# Patient Record
Sex: MALE | Race: WHITE | NOT HISPANIC OR LATINO
[De-identification: names, ages, dates, MRNs, and addresses within clinical notes are randomized per-mention and may not be internally consistent; named-entity substitution may affect disease eponyms.]

---

## 2021-12-06 ENCOUNTER — APPOINTMENT (OUTPATIENT)
Dept: INTERNAL MEDICINE | Facility: CLINIC | Age: 61
End: 2021-12-06
Payer: COMMERCIAL

## 2021-12-06 ENCOUNTER — LABORATORY RESULT (OUTPATIENT)
Age: 61
End: 2021-12-06

## 2021-12-06 VITALS
SYSTOLIC BLOOD PRESSURE: 120 MMHG | DIASTOLIC BLOOD PRESSURE: 72 MMHG | WEIGHT: 219 LBS | RESPIRATION RATE: 12 BRPM | OXYGEN SATURATION: 96 % | HEART RATE: 70 BPM | BODY MASS INDEX: 28.12 KG/M2 | TEMPERATURE: 96.9 F

## 2021-12-06 DIAGNOSIS — E03.9 HYPOTHYROIDISM, UNSPECIFIED: ICD-10-CM

## 2021-12-06 PROCEDURE — 99386 PREV VISIT NEW AGE 40-64: CPT | Mod: 25

## 2021-12-06 PROCEDURE — 99406 BEHAV CHNG SMOKING 3-10 MIN: CPT

## 2021-12-06 NOTE — PHYSICAL EXAM
[No Acute Distress] : no acute distress [Well Nourished] : well nourished [Well-Appearing] : well-appearing [Normal Sclera/Conjunctiva] : normal sclera/conjunctiva [EOMI] : extraocular movements intact [Normal Outer Ear/Nose] : the outer ears and nose were normal in appearance [No JVD] : no jugular venous distention [No Lymphadenopathy] : no lymphadenopathy [Supple] : supple [Thyroid Normal, No Nodules] : the thyroid was normal and there were no nodules present [No Respiratory Distress] : no respiratory distress  [No Accessory Muscle Use] : no accessory muscle use [Clear to Auscultation] : lungs were clear to auscultation bilaterally [Normal Rate] : normal rate  [Regular Rhythm] : with a regular rhythm [Normal S1, S2] : normal S1 and S2 [No Murmur] : no murmur heard [No Carotid Bruits] : no carotid bruits [No Abdominal Bruit] : a ~M bruit was not heard ~T in the abdomen [No Varicosities] : no varicosities [Pedal Pulses Present] : the pedal pulses are present [No Edema] : there was no peripheral edema [No Palpable Aorta] : no palpable aorta [No Extremity Clubbing/Cyanosis] : no extremity clubbing/cyanosis [Soft] : abdomen soft [Non Tender] : non-tender [Non-distended] : non-distended [Normal Bowel Sounds] : normal bowel sounds [Normal Posterior Cervical Nodes] : no posterior cervical lymphadenopathy [Normal Anterior Cervical Nodes] : no anterior cervical lymphadenopathy [No CVA Tenderness] : no CVA  tenderness [No Spinal Tenderness] : no spinal tenderness [No Joint Swelling] : no joint swelling [Grossly Normal Strength/Tone] : grossly normal strength/tone [No Rash] : no rash [Coordination Grossly Intact] : coordination grossly intact [No Focal Deficits] : no focal deficits [Normal Gait] : normal gait [Normal Affect] : the affect was normal [Normal Insight/Judgement] : insight and judgment were intact

## 2021-12-08 NOTE — ASSESSMENT
[FreeTextEntry1] : Physical\par \par Declines flu shot\par \par Weight management, healthy food choices, and increased physical activity \par \par Active smoker > 30pack years\par Smoking cessation discussed\par referred for low dose CT chest\par \par Hypothyroid\par cont Levothyroxine 75mcg daily\par we'll check TSH/T4 today\par \par blood work ordered\par \par follow up in one week for lab results

## 2021-12-08 NOTE — HISTORY OF PRESENT ILLNESS
[de-identified] : Mr. SYLVESTER DUBON is a 61 year old male, accompanied by his wife, with history of hypothyroid presents for initial evaluation /  annual physical\par \par He is doing well, Offers no complaints \par He admits not consistent with exercise, but tries to maintain a low cholesterol diet\par Denies SOB, chest pain, abdominal pain, N/V/D, leg swelling, headache, dizziness \par \par Currently smokes 1 pack/day for > than 30 years

## 2021-12-08 NOTE — HEALTH RISK ASSESSMENT
[Yes] : Yes [Patient reported colonoscopy was normal] : Patient reported colonoscopy was normal [Employed] : employed [] :  [Sexually Active] : sexually active [de-identified] : 1 pack/day > 30 years [de-identified] : Socially  [ColonoscopyDate] : 2019

## 2021-12-08 NOTE — COUNSELING
[Risk of tobacco use and health benefits of smoking cessation discussed] : Risk of tobacco use and health benefits of smoking cessation discussed [Use of nicotine replacement therapies and other medications discussed] : Use of nicotine replacement therapies and other medications discussed [Benefits of weight loss discussed] : Benefits of weight loss discussed [Encouraged to increase physical activity] : Encouraged to increase physical activity

## 2021-12-14 LAB
25(OH)D3 SERPL-MCNC: 36.2 NG/ML
ALBUMIN SERPL ELPH-MCNC: 4.4 G/DL
ALP BLD-CCNC: 58 U/L
ALT SERPL-CCNC: 11 U/L
ANION GAP SERPL CALC-SCNC: 14 MMOL/L
APPEARANCE: CLEAR
AST SERPL-CCNC: 17 U/L
BASOPHILS # BLD AUTO: 0.08 K/UL
BASOPHILS NFR BLD AUTO: 0.9 %
BILIRUB SERPL-MCNC: 0.3 MG/DL
BILIRUBIN URINE: NEGATIVE
BLOOD URINE: NEGATIVE
BUN SERPL-MCNC: 13 MG/DL
CALCIUM SERPL-MCNC: 9.8 MG/DL
CHLORIDE SERPL-SCNC: 102 MMOL/L
CHOLEST SERPL-MCNC: 243 MG/DL
CO2 SERPL-SCNC: 21 MMOL/L
COLOR: NORMAL
COVID-19 NUCLEOCAPSID  GAM ANTIBODY INTERPRETATION: NEGATIVE
COVID-19 SPIKE DOMAIN ANTIBODY INTERPRETATION: POSITIVE
CREAT SERPL-MCNC: 0.84 MG/DL
EOSINOPHIL # BLD AUTO: 0.23 K/UL
EOSINOPHIL NFR BLD AUTO: 2.6 %
ESTIMATED AVERAGE GLUCOSE: 114 MG/DL
GLUCOSE QUALITATIVE U: NEGATIVE
GLUCOSE SERPL-MCNC: 59 MG/DL
HBA1C MFR BLD HPLC: 5.6 %
HCT VFR BLD CALC: 48.3 %
HDLC SERPL-MCNC: 38 MG/DL
HGB BLD-MCNC: 15.7 G/DL
IMM GRANULOCYTES NFR BLD AUTO: 0.6 %
KETONES URINE: NEGATIVE
LDLC SERPL CALC-MCNC: 186 MG/DL
LEUKOCYTE ESTERASE URINE: NEGATIVE
LYMPHOCYTES # BLD AUTO: 3.15 K/UL
LYMPHOCYTES NFR BLD AUTO: 35.4 %
MAN DIFF?: NORMAL
MCHC RBC-ENTMCNC: 31.5 PG
MCHC RBC-ENTMCNC: 32.5 GM/DL
MCV RBC AUTO: 96.8 FL
MONOCYTES # BLD AUTO: 0.52 K/UL
MONOCYTES NFR BLD AUTO: 5.8 %
NEUTROPHILS # BLD AUTO: 4.88 K/UL
NEUTROPHILS NFR BLD AUTO: 54.7 %
NITRITE URINE: NEGATIVE
NONHDLC SERPL-MCNC: 205 MG/DL
PH URINE: 5
PLATELET # BLD AUTO: 219 K/UL
POTASSIUM SERPL-SCNC: 4.2 MMOL/L
PROT SERPL-MCNC: 7.3 G/DL
PROTEIN URINE: NEGATIVE
PSA SERPL-MCNC: 1.4 NG/ML
RBC # BLD: 4.99 M/UL
RBC # FLD: 13 %
SARS-COV-2 AB SERPL IA-ACNC: >250 U/ML
SARS-COV-2 AB SERPL QL IA: 0.09 INDEX
SODIUM SERPL-SCNC: 137 MMOL/L
SPECIFIC GRAVITY URINE: 1.02
TRIGL SERPL-MCNC: 94 MG/DL
TSH SERPL-ACNC: 4.26 UIU/ML
UROBILINOGEN URINE: NORMAL
VIT B12 SERPL-MCNC: 607 PG/ML
WBC # FLD AUTO: 8.91 K/UL

## 2021-12-27 ENCOUNTER — APPOINTMENT (OUTPATIENT)
Dept: UROLOGY | Facility: CLINIC | Age: 61
End: 2021-12-27
Payer: COMMERCIAL

## 2021-12-27 VITALS
HEIGHT: 74 IN | DIASTOLIC BLOOD PRESSURE: 72 MMHG | TEMPERATURE: 96.6 F | OXYGEN SATURATION: 96 % | RESPIRATION RATE: 12 BRPM | SYSTOLIC BLOOD PRESSURE: 109 MMHG | HEART RATE: 80 BPM

## 2021-12-27 DIAGNOSIS — N52.9 MALE ERECTILE DYSFUNCTION, UNSPECIFIED: ICD-10-CM

## 2021-12-27 PROCEDURE — 99204 OFFICE O/P NEW MOD 45 MIN: CPT

## 2021-12-27 PROCEDURE — 99406 BEHAV CHNG SMOKING 3-10 MIN: CPT

## 2021-12-27 RX ORDER — SILDENAFIL 20 MG/1
20 TABLET ORAL DAILY
Qty: 90 | Refills: 1 | Status: ACTIVE | COMMUNITY
Start: 2021-12-27 | End: 1900-01-01

## 2021-12-27 NOTE — ASSESSMENT
[FreeTextEntry1] : Very pleasant 61-year-old gentleman who presents for evaluation of erectile dysfunction, screening for prostate cancer\par -Discussed the rationale for screening for prostate cancer with PSA and digital rectal exam. We discussed risk factors for the development of prostate cancer.  We also discussed the natural history of prostate cancer.\par -Hemoglobin A1c 5.6\par -Urinalysis demonstrates no evidence of urinary tract infection no blood in urine\par -PSA 1.4\par -Discussed that his risk for prostate cancer is low given his negative PARMINDER and low PSA\par -Continue sildenafil 20 mg–refill sent to the pharmacy\par -I discussed the risks, benefits, alternatives, and possible side effects of Viagra (sildenafil) therapy with the patient, including but not limited to headache, flushing, upset stomach, blurry vision, change in color vision, vision loss, and priapism with the patient.\par -Patient was counseled on smoking cessation for 5 minutes.  We discussed various health benefits of quitting.  We discussed the potential Urologic implications of smoking, including an increased risk of bladder and upper tract urothelial carcinoma, kidney cancer, and a potential link to erectile dysfunction.\par -Follow-up in 1 year

## 2021-12-27 NOTE — HISTORY OF PRESENT ILLNESS
[FreeTextEntry1] : Very pleasant 61-year-old gentleman presents for evaluation of erectile dysfunction and screening for prostate cancer.  He reports that sildenafil 20 mg significantly improves his erections.  He reports that he is happy with this medication.  He denies dysuria.  No hematuria.  No nausea or vomiting.  No difficulty urinating.  He reports no family history of  malignancy, including prostate cancer.  Recent PSA was found to be 1.4 from December 2021.  Urinalysis demonstrated no evidence of urinary tract infection.  No microscopic hematuria.  Hemoglobin A1c 5.6\par \par Patient smokes cigarettes daily

## 2022-03-31 DIAGNOSIS — E78.00 PURE HYPERCHOLESTEROLEMIA, UNSPECIFIED: ICD-10-CM

## 2022-10-16 ENCOUNTER — INPATIENT (INPATIENT)
Facility: HOSPITAL | Age: 62
LOS: 6 days | Discharge: HOME | End: 2022-10-23
Attending: INTERNAL MEDICINE | Admitting: INTERNAL MEDICINE
Payer: COMMERCIAL

## 2022-10-16 VITALS
RESPIRATION RATE: 22 BRPM | DIASTOLIC BLOOD PRESSURE: 83 MMHG | OXYGEN SATURATION: 100 % | HEART RATE: 69 BPM | SYSTOLIC BLOOD PRESSURE: 117 MMHG

## 2022-10-16 DIAGNOSIS — I21.9 ACUTE MYOCARDIAL INFARCTION, UNSPECIFIED: ICD-10-CM

## 2022-10-16 LAB
ALBUMIN SERPL ELPH-MCNC: 4.3 G/DL — SIGNIFICANT CHANGE UP (ref 3.3–5)
ALP SERPL-CCNC: 64 U/L — SIGNIFICANT CHANGE UP (ref 40–120)
ALT FLD-CCNC: 20 U/L — SIGNIFICANT CHANGE UP (ref 4–41)
ANION GAP SERPL CALC-SCNC: 13 MMOL/L — SIGNIFICANT CHANGE UP (ref 7–14)
ANION GAP SERPL CALC-SCNC: 16 MMOL/L — HIGH (ref 7–14)
APTT BLD: >200 SEC — CRITICAL HIGH (ref 27–36.3)
AST SERPL-CCNC: 21 U/L — SIGNIFICANT CHANGE UP (ref 4–40)
B PERT DNA SPEC QL NAA+PROBE: SIGNIFICANT CHANGE UP
B PERT+PARAPERT DNA PNL SPEC NAA+PROBE: SIGNIFICANT CHANGE UP
BASOPHILS # BLD AUTO: 0 K/UL — SIGNIFICANT CHANGE UP (ref 0–0.2)
BASOPHILS NFR BLD AUTO: 0 % — SIGNIFICANT CHANGE UP (ref 0–2)
BILIRUB SERPL-MCNC: 0.5 MG/DL — SIGNIFICANT CHANGE UP (ref 0.2–1.2)
BLD GP AB SCN SERPL QL: POSITIVE — SIGNIFICANT CHANGE UP
BORDETELLA PARAPERTUSSIS (RAPRVP): SIGNIFICANT CHANGE UP
BUN SERPL-MCNC: 14 MG/DL — SIGNIFICANT CHANGE UP (ref 7–23)
BUN SERPL-MCNC: 15 MG/DL — SIGNIFICANT CHANGE UP (ref 7–23)
C PNEUM DNA SPEC QL NAA+PROBE: SIGNIFICANT CHANGE UP
CALCIUM SERPL-MCNC: 9.2 MG/DL — SIGNIFICANT CHANGE UP (ref 8.4–10.5)
CALCIUM SERPL-MCNC: 9.5 MG/DL — SIGNIFICANT CHANGE UP (ref 8.4–10.5)
CHLORIDE SERPL-SCNC: 100 MMOL/L — SIGNIFICANT CHANGE UP (ref 98–107)
CHLORIDE SERPL-SCNC: 99 MMOL/L — SIGNIFICANT CHANGE UP (ref 98–107)
CK MB BLD-MCNC: 9.2 % — HIGH (ref 0–2.5)
CK MB CFR SERPL CALC: 264.9 NG/ML — HIGH
CK MB CFR SERPL CALC: 4.7 NG/ML — SIGNIFICANT CHANGE UP
CK SERPL-CCNC: 2890 U/L — HIGH (ref 30–200)
CO2 SERPL-SCNC: 21 MMOL/L — LOW (ref 22–31)
CO2 SERPL-SCNC: 21 MMOL/L — LOW (ref 22–31)
CREAT SERPL-MCNC: 0.72 MG/DL — SIGNIFICANT CHANGE UP (ref 0.5–1.3)
CREAT SERPL-MCNC: 1.01 MG/DL — SIGNIFICANT CHANGE UP (ref 0.5–1.3)
EGFR: 105 ML/MIN/1.73M2 — SIGNIFICANT CHANGE UP
EGFR: 85 ML/MIN/1.73M2 — SIGNIFICANT CHANGE UP
EOSINOPHIL # BLD AUTO: 0 K/UL — SIGNIFICANT CHANGE UP (ref 0–0.5)
EOSINOPHIL NFR BLD AUTO: 0 % — SIGNIFICANT CHANGE UP (ref 0–6)
FLUAV SUBTYP SPEC NAA+PROBE: SIGNIFICANT CHANGE UP
FLUBV RNA SPEC QL NAA+PROBE: SIGNIFICANT CHANGE UP
GLUCOSE SERPL-MCNC: 118 MG/DL — HIGH (ref 70–99)
GLUCOSE SERPL-MCNC: 139 MG/DL — HIGH (ref 70–99)
HADV DNA SPEC QL NAA+PROBE: SIGNIFICANT CHANGE UP
HCOV 229E RNA SPEC QL NAA+PROBE: SIGNIFICANT CHANGE UP
HCOV HKU1 RNA SPEC QL NAA+PROBE: SIGNIFICANT CHANGE UP
HCOV NL63 RNA SPEC QL NAA+PROBE: SIGNIFICANT CHANGE UP
HCOV OC43 RNA SPEC QL NAA+PROBE: SIGNIFICANT CHANGE UP
HCT VFR BLD CALC: 43.3 % — SIGNIFICANT CHANGE UP (ref 39–50)
HCT VFR BLD CALC: 43.8 % — SIGNIFICANT CHANGE UP (ref 39–50)
HCT VFR BLD CALC: 43.9 % — SIGNIFICANT CHANGE UP (ref 39–50)
HGB BLD-MCNC: 14.6 G/DL — SIGNIFICANT CHANGE UP (ref 13–17)
HGB BLD-MCNC: 14.8 G/DL — SIGNIFICANT CHANGE UP (ref 13–17)
HGB BLD-MCNC: 14.9 G/DL — SIGNIFICANT CHANGE UP (ref 13–17)
HMPV RNA SPEC QL NAA+PROBE: SIGNIFICANT CHANGE UP
HPIV1 RNA SPEC QL NAA+PROBE: SIGNIFICANT CHANGE UP
HPIV2 RNA SPEC QL NAA+PROBE: SIGNIFICANT CHANGE UP
HPIV3 RNA SPEC QL NAA+PROBE: SIGNIFICANT CHANGE UP
HPIV4 RNA SPEC QL NAA+PROBE: SIGNIFICANT CHANGE UP
IANC: 11.32 K/UL — HIGH (ref 1.8–7.4)
LYMPHOCYTES # BLD AUTO: 13.1 % — SIGNIFICANT CHANGE UP (ref 13–44)
LYMPHOCYTES # BLD AUTO: 2.26 K/UL — SIGNIFICANT CHANGE UP (ref 1–3.3)
M PNEUMO DNA SPEC QL NAA+PROBE: SIGNIFICANT CHANGE UP
MAGNESIUM SERPL-MCNC: 1.9 MG/DL — SIGNIFICANT CHANGE UP (ref 1.6–2.6)
MAGNESIUM SERPL-MCNC: 1.9 MG/DL — SIGNIFICANT CHANGE UP (ref 1.6–2.6)
MCHC RBC-ENTMCNC: 30.8 PG — SIGNIFICANT CHANGE UP (ref 27–34)
MCHC RBC-ENTMCNC: 31.1 PG — SIGNIFICANT CHANGE UP (ref 27–34)
MCHC RBC-ENTMCNC: 31.2 PG — SIGNIFICANT CHANGE UP (ref 27–34)
MCHC RBC-ENTMCNC: 33.7 GM/DL — SIGNIFICANT CHANGE UP (ref 32–36)
MCHC RBC-ENTMCNC: 33.7 GM/DL — SIGNIFICANT CHANGE UP (ref 32–36)
MCHC RBC-ENTMCNC: 34 GM/DL — SIGNIFICANT CHANGE UP (ref 32–36)
MCV RBC AUTO: 91.4 FL — SIGNIFICANT CHANGE UP (ref 80–100)
MCV RBC AUTO: 91.4 FL — SIGNIFICANT CHANGE UP (ref 80–100)
MCV RBC AUTO: 92.4 FL — SIGNIFICANT CHANGE UP (ref 80–100)
MONOCYTES # BLD AUTO: 0.91 K/UL — HIGH (ref 0–0.9)
MONOCYTES NFR BLD AUTO: 5.3 % — SIGNIFICANT CHANGE UP (ref 2–14)
NEUTROPHILS # BLD AUTO: 11.8 K/UL — HIGH (ref 1.8–7.4)
NEUTROPHILS NFR BLD AUTO: 68.4 % — SIGNIFICANT CHANGE UP (ref 43–77)
NRBC # BLD: 0 /100 WBCS — SIGNIFICANT CHANGE UP (ref 0–0)
NRBC # BLD: 0 /100 WBCS — SIGNIFICANT CHANGE UP (ref 0–0)
NRBC # FLD: 0 K/UL — SIGNIFICANT CHANGE UP (ref 0–0)
NRBC # FLD: 0 K/UL — SIGNIFICANT CHANGE UP (ref 0–0)
NT-PROBNP SERPL-SCNC: 45 PG/ML — SIGNIFICANT CHANGE UP
PHOSPHATE SERPL-MCNC: 3.8 MG/DL — SIGNIFICANT CHANGE UP (ref 2.5–4.5)
PLATELET # BLD AUTO: 204 K/UL — SIGNIFICANT CHANGE UP (ref 150–400)
PLATELET # BLD AUTO: 212 K/UL — SIGNIFICANT CHANGE UP (ref 150–400)
PLATELET # BLD AUTO: 264 K/UL — SIGNIFICANT CHANGE UP (ref 150–400)
POTASSIUM SERPL-MCNC: 3.5 MMOL/L — SIGNIFICANT CHANGE UP (ref 3.5–5.3)
POTASSIUM SERPL-MCNC: 4.3 MMOL/L — SIGNIFICANT CHANGE UP (ref 3.5–5.3)
POTASSIUM SERPL-SCNC: 3.5 MMOL/L — SIGNIFICANT CHANGE UP (ref 3.5–5.3)
POTASSIUM SERPL-SCNC: 4.3 MMOL/L — SIGNIFICANT CHANGE UP (ref 3.5–5.3)
PROT SERPL-MCNC: 7.1 G/DL — SIGNIFICANT CHANGE UP (ref 6–8.3)
RAPID RVP RESULT: SIGNIFICANT CHANGE UP
RBC # BLD: 4.74 M/UL — SIGNIFICANT CHANGE UP (ref 4.2–5.8)
RBC # BLD: 4.75 M/UL — SIGNIFICANT CHANGE UP (ref 4.2–5.8)
RBC # BLD: 4.79 M/UL — SIGNIFICANT CHANGE UP (ref 4.2–5.8)
RBC # FLD: 12.6 % — SIGNIFICANT CHANGE UP (ref 10.3–14.5)
RBC # FLD: 12.6 % — SIGNIFICANT CHANGE UP (ref 10.3–14.5)
RBC # FLD: 12.9 % — SIGNIFICANT CHANGE UP (ref 10.3–14.5)
RH IG SCN BLD-IMP: POSITIVE — SIGNIFICANT CHANGE UP
RSV RNA SPEC QL NAA+PROBE: SIGNIFICANT CHANGE UP
RV+EV RNA SPEC QL NAA+PROBE: SIGNIFICANT CHANGE UP
SARS-COV-2 RNA SPEC QL NAA+PROBE: SIGNIFICANT CHANGE UP
SODIUM SERPL-SCNC: 134 MMOL/L — LOW (ref 135–145)
SODIUM SERPL-SCNC: 136 MMOL/L — SIGNIFICANT CHANGE UP (ref 135–145)
TROPONIN T, HIGH SENSITIVITY RESULT: 11 NG/L — SIGNIFICANT CHANGE UP
TROPONIN T, HIGH SENSITIVITY RESULT: 5370 NG/L — CRITICAL HIGH
WBC # BLD: 15.75 K/UL — HIGH (ref 3.8–10.5)
WBC # BLD: 17.25 K/UL — HIGH (ref 3.8–10.5)
WBC # BLD: 17.56 K/UL — HIGH (ref 3.8–10.5)
WBC # FLD AUTO: 15.75 K/UL — HIGH (ref 3.8–10.5)
WBC # FLD AUTO: 17.25 K/UL — HIGH (ref 3.8–10.5)
WBC # FLD AUTO: 17.56 K/UL — HIGH (ref 3.8–10.5)

## 2022-10-16 PROCEDURE — 33967 INSERT I-AORT PERCUT DEVICE: CPT

## 2022-10-16 PROCEDURE — 71045 X-RAY EXAM CHEST 1 VIEW: CPT | Mod: 26

## 2022-10-16 PROCEDURE — 93458 L HRT ARTERY/VENTRICLE ANGIO: CPT | Mod: 26,59

## 2022-10-16 PROCEDURE — 86077 PHYS BLOOD BANK SERV XMATCH: CPT

## 2022-10-16 PROCEDURE — 92941 PRQ TRLML REVSC TOT OCCL AMI: CPT | Mod: LD

## 2022-10-16 PROCEDURE — 93010 ELECTROCARDIOGRAM REPORT: CPT

## 2022-10-16 PROCEDURE — 99285 EMERGENCY DEPT VISIT HI MDM: CPT

## 2022-10-16 RX ORDER — HEPARIN SODIUM 5000 [USP'U]/ML
1000 INJECTION INTRAVENOUS; SUBCUTANEOUS
Qty: 25000 | Refills: 0 | Status: DISCONTINUED | OUTPATIENT
Start: 2022-10-16 | End: 2022-10-17

## 2022-10-16 RX ORDER — ACETAMINOPHEN 500 MG
1000 TABLET ORAL ONCE
Refills: 0 | Status: COMPLETED | OUTPATIENT
Start: 2022-10-16 | End: 2022-10-16

## 2022-10-16 RX ORDER — POTASSIUM CHLORIDE 20 MEQ
40 PACKET (EA) ORAL ONCE
Refills: 0 | Status: COMPLETED | OUTPATIENT
Start: 2022-10-16 | End: 2022-10-16

## 2022-10-16 RX ORDER — ACETAMINOPHEN 500 MG
650 TABLET ORAL EVERY 6 HOURS
Refills: 0 | Status: DISCONTINUED | OUTPATIENT
Start: 2022-10-16 | End: 2022-10-16

## 2022-10-16 RX ORDER — ALPRAZOLAM 0.25 MG
0.25 TABLET ORAL ONCE
Refills: 0 | Status: DISCONTINUED | OUTPATIENT
Start: 2022-10-16 | End: 2022-10-16

## 2022-10-16 RX ORDER — ATORVASTATIN CALCIUM 80 MG/1
80 TABLET, FILM COATED ORAL AT BEDTIME
Refills: 0 | Status: DISCONTINUED | OUTPATIENT
Start: 2022-10-16 | End: 2022-10-23

## 2022-10-16 RX ORDER — TICAGRELOR 90 MG/1
90 TABLET ORAL EVERY 12 HOURS
Refills: 0 | Status: DISCONTINUED | OUTPATIENT
Start: 2022-10-17 | End: 2022-10-23

## 2022-10-16 RX ORDER — TICAGRELOR 90 MG/1
180 TABLET ORAL ONCE
Refills: 0 | Status: COMPLETED | OUTPATIENT
Start: 2022-10-16 | End: 2022-10-16

## 2022-10-16 RX ORDER — ASPIRIN/CALCIUM CARB/MAGNESIUM 324 MG
81 TABLET ORAL DAILY
Refills: 0 | Status: DISCONTINUED | OUTPATIENT
Start: 2022-10-17 | End: 2022-10-23

## 2022-10-16 RX ORDER — HEPARIN SODIUM 5000 [USP'U]/ML
4000 INJECTION INTRAVENOUS; SUBCUTANEOUS ONCE
Refills: 0 | Status: COMPLETED | OUTPATIENT
Start: 2022-10-16 | End: 2022-10-16

## 2022-10-16 RX ORDER — NICOTINE POLACRILEX 2 MG
1 GUM BUCCAL DAILY
Refills: 0 | Status: DISCONTINUED | OUTPATIENT
Start: 2022-10-16 | End: 2022-10-23

## 2022-10-16 RX ADMIN — Medication 0.25 MILLIGRAM(S): at 22:31

## 2022-10-16 RX ADMIN — Medication 1000 MILLIGRAM(S): at 20:49

## 2022-10-16 RX ADMIN — Medication 40 MILLIEQUIVALENT(S): at 20:02

## 2022-10-16 RX ADMIN — ATORVASTATIN CALCIUM 80 MILLIGRAM(S): 80 TABLET, FILM COATED ORAL at 21:08

## 2022-10-16 RX ADMIN — HEPARIN SODIUM 4000 UNIT(S): 5000 INJECTION INTRAVENOUS; SUBCUTANEOUS at 16:10

## 2022-10-16 RX ADMIN — TICAGRELOR 180 MILLIGRAM(S): 90 TABLET ORAL at 16:10

## 2022-10-16 RX ADMIN — HEPARIN SODIUM 10 UNIT(S)/HR: 5000 INJECTION INTRAVENOUS; SUBCUTANEOUS at 20:09

## 2022-10-16 RX ADMIN — Medication 400 MILLIGRAM(S): at 20:19

## 2022-10-16 RX ADMIN — Medication 1 PATCH: at 20:08

## 2022-10-16 NOTE — ED PROVIDER NOTE - OBJECTIVE STATEMENT
62 Y M H/O HCL, Smoking, presenting with acute onset mid sternal chest pain with B/L UE numbness after viagra use, now complaining of 62 Y M H/O HCL, Smoking, early Fhx MI death, presenting with acute onset mid sternal chest pain with B/L UE numbness after viagra use, now complaining of midsternal tearing chest pain, B/L UE numbness,

## 2022-10-16 NOTE — H&P ADULT - NSHPPHYSICALEXAM_GEN_ALL_CORE
.  VITAL SIGNS:  T(C): --  T(F): --  HR: 69 (10-16-22 @ 16:03) (69 - 69)  BP: 117/83 (10-16-22 @ 16:03) (117/83 - 117/83)  BP(mean): --  RR: 22 (10-16-22 @ 16:03) (22 - 22)  SpO2: 100% (10-16-22 @ 16:03) (100% - 100%)  Wt(kg): --    PHYSICAL EXAM:    Constitutional: WDWN resting comfortably in bed; NAD  Head: NC/AT  Eyes: PERRL, EOMI, anicteric sclera  ENT: no nasal discharge; uvula midline, no oropharyngeal erythema or exudates; MMM  Neck: supple; no JVD or thyromegaly  Respiratory: CTA B/L; no W/R/R, no retractions  Cardiac: +S1/S2; RRR; no M/R/G; PMI non-displaced  Gastrointestinal: soft, NT/ND; no rebound or guarding; +BSx4  Genitourinary: normal external genitalia  Back: spine midline, no bony tenderness or step-offs; no CVAT B/L  Extremities: WWP, no clubbing or cyanosis; no peripheral edema. Capillary refill <2 sec  Musculoskeletal: NROM x4; no joint swelling, tenderness or erythema  Vascular: 2+ radial, femoral, DP/PT pulses B/L  Dermatologic: skin warm, dry and intact; no rashes, wounds, or scars  Lymphatic: no submandibular or cervical LAD  Neurologic: AAOx3; CNII-XII grossly intact; no focal deficits  Psychiatric: affect and characteristics of appearance, verbalizations, behaviors are appropriate

## 2022-10-16 NOTE — ED PROVIDER NOTE - CLINICAL SUMMARY MEDICAL DECISION MAKING FREE TEXT BOX
62 Y M presenting with midsternal chest pain, primary concern for acute MI vs. dissection, in setting of tearing midsternal chest pain, tearing, B/L UE numbness, with decreased diastolic pressure on L. arm compared to R. arm, will rule out aortic dissection prior to angiography in setting of risk of missed aortic emergency. cardiology consult, holding off on anticoagulation prior to cardiology evaluation.

## 2022-10-16 NOTE — ED ADULT NURSE NOTE - OBJECTIVE STATEMENT
Pt is a 63 y/o Male, A&Ox4, ambulatory at baseline BIBEMS from home c/o sudden onset tearing midsternal chest pain. Pt reports b/l upper extremity numbness after taking Viagra. Pt appears pale, diaphoretic and tachypneic. Pt's penis not noted to be erect after taking Viagra. Pt currently on NRB for comfort, SpO2 100%. 18g in RAC and 18g LAC placed by EMS. Pt is a 63 y/o Male, A&Ox4, ambulatory at baseline BIBEMS from home c/o sudden onset tearing midsternal chest pain. Pt reports b/l upper extremity numbness after taking Viagra. Pt appears pale, diaphoretic and tachypneic. Pt's penis not noted to be erect after taking Viagra. Pt currently on NRB for comfort, SpO2 100%. Arrives with wife at bedside. Appears very uncomfortable upon initial exam. Placed on cardiac monitor/Zoll monitoring. ED tech at bedside for repeat EKG. CCU RN also at bedside. Facilitator Shirin monitoring. 18g in RAC and 18g LAC placed by EMS PTA, IV patent and flushing well. Medicated per MD orders, see MAR. Labs collected and sent, safety maintained. Pt sent to cath lab accompanied with RN. Being closely monitored throughout.

## 2022-10-16 NOTE — ED PROVIDER NOTE - PHYSICAL EXAMINATION
General: Diaphoretic  Head: Atraumatic  Eyes: EOM grossly in tact, no scleral icterus  ENT: moist mucous membranes  Neurology: A&Ox3, nonfocal, SULTANA x 4  Respiratory: normal respiratory effort  CV: Extremities warm and well perfused  Abdominal: Soft, non-distended  Extremities: No edema  Skin: No rashes

## 2022-10-16 NOTE — ED PROVIDER NOTE - PROGRESS NOTE DETAILS
Jamir Herrera MD:  Presenting with acute onset chest pain s/p viagra, L. sided chest pain with B/L UE numbness, ST [id number]; however, in setting ofnew exertional activity B/L UE numbness, will R/O dissection prior to anticoagulation, CODe stemi activated, awaiting cardiologist. Improving pain by comparison to arrive s/p further fluid resuscitation. Jamir Herrera MD:  Attempting to contact cath lab for transport Jamir Herrera MD:  Cardiology assuming direct care for patient, ordering heparin and brilinta per cardiology request, will evaluate for Dissection in cardiology lab.

## 2022-10-16 NOTE — PATIENT PROFILE ADULT - FALL HARM RISK - HARM RISK INTERVENTIONS

## 2022-10-16 NOTE — ED PROVIDER NOTE - ATTENDING CONTRIBUTION TO CARE
The patient is a 62y Male who has a past medical and surgery history of  HTN HLD PTED with chest pain while exertion self sudden sharp radiating to both arms with yesenia upper extremity numbness after viagra use    Vital Signs Last 24 Hrs  117/83 HR 64 RR 24 SPO2 100%   PE: diaphoretic anxious as described; DATA:  EKG: NSR@64 AS wall MI no old EKG   LAB: Pending at time of evaluation    IMPRESSION/RISK:  Dx= chest pain ? ACS    Consideration include hypotension in setting of exertion hypotension diaphoresis     Plan  Cardiology at bedside after stemi email phone conversation  labs sent   asa brillinta bolus heparin  CTA to be performed on table

## 2022-10-16 NOTE — H&P ADULT - ASSESSMENT
63 yo M PMH current smoker presenting with acute onset mid sternal chest pain with B/L UE numbness s/p viagra use, found to hae LUKASZ in ____    NEURO    RESP    CV  #STEMI    GI        ENDO    ID    PPX  63 yo M PMH current smoker presenting with acute onset mid sternal chest pain with B/L UE numbness s/p viagra use, found to hae LUKASZ in V1-6 and II with T wave inversions in III, s/p NORMAN pLAD with planned staging to RCA and Cx.     NEURO  -No issues  -A/O 3    RESP  -No issues  -Satting well on RA    CV  #STEMI  -EKG NSR with LUKASZ in V1-6 and II with T wave inversions in III  -S/p cath with significant occlusions in RCA and Cx, 99% occlusion of proximal LAD s/p PCI  -S/p ASA and brilinta load 10/16. C/w daily DAPT  -C/w heparin gtt   -F/u A1c, TSH, lipid panel  -Daily CXR for intraaortic balloon pump  -Trend Trop (11 at admission) and CKMB    #CAD  -Atorvastatin 80 mg daily     GI  -No issues  -DASH diet    /Renal  -No issues    ENDO  -F/u A1c  -Pt with known hypothyroidism, on levothyroxine 25    ID  #Leukocytosis  -WBC 17 on admission, CTM    PPX   #DVTppx  -On heparin gtt  61 yo M PMH current smoker presenting with acute onset mid sternal chest pain with B/L UE numbness s/p viagra use, found to hae LUKASZ in V1-6 and II with T wave inversions in III, s/p NORMAN pLAD with planned staging to RCA and Cx.     NEURO  -No issues  -A/O 3    RESP  -No issues  -Satting well on RA    CV  #STEMI  -EKG NSR with LUKASZ in V1-6 and II with T wave inversions in III  -S/p cath with significant occlusions in RCA and Cx, 99% occlusion of proximal LAD s/p PCI  -S/p ASA and brilinta load 10/16. C/w daily DAPT  -C/w heparin gtt   -F/u A1c, TSH, lipid panel  -Daily CXR for intraaortic balloon pump  -Trend Trop (11 at admission) and CKMB    Addendum: Spoke to Dr. Blount regarding persistent AIVR - CTM. If augmented BP remains in low 80s, can start lidocaine 50     #CAD  -Atorvastatin 80 mg daily     GI  -No issues  -DASH diet    /Renal  -No issues    ENDO  -F/u A1c  -Pt with known hypothyroidism, on levothyroxine 25    ID  #Leukocytosis  -WBC 17 on admission, CTM    PPX   #DVTppx  -On heparin gtt  61 yo M PMH current smoker presenting with acute onset mid sternal chest pain with B/L UE numbness s/p viagra use, found to hae LUKASZ in V1-6 and II with T wave inversions in III, s/p NORMAN pLAD with planned staging to RCA and Cx.     NEURO  -No issues  -A/O 3    RESP  -No issues  -Satting well on RA    CV  #STEMI  -EKG NSR with LUKASZ in V1-6 and II with T wave inversions in III  -S/p cath with significant occlusions in RCA and Cx, 99% occlusion of proximal LAD s/p PCI  -S/p ASA and brilinta load 10/16. C/w daily DAPT  -C/w heparin gtt   -F/u A1c, TSH, lipid panel  -Daily CXR for intraaortic balloon pump  -Trend Trop (11 at admission) and CKMB    Addendum: Spoke to Dr. Blount regarding persistent AIVR - CTM. If augmented BP remains in low 80s, can start lidocaine 50     #CAD  -Atorvastatin 80 mg daily     GI  -No issues  -DASH diet    /Renal  -No issues    ENDO  -F/u A1c  -Pt with known hypothyroidism, on levothyroxine 25    ID  #Leukocytosis  -WBC 17 on admission, CTM    Preventive   #DVTppx  -On heparin gtt     #Tobacco cessation  -Nicotine patch, smoking cessation counseling and resources offered

## 2022-10-16 NOTE — H&P ADULT - NSHPLABSRESULTS_GEN_ALL_CORE
.  LABS:                         14.9   17.25 )-----------( 264      ( 16 Oct 2022 15:51 )             43.8                         RADIOLOGY, EKG & ADDITIONAL TESTS: Reviewed. .  LABS:                         14.9   17.25 )-----------( 264      ( 16 Oct 2022 15:51 )             43.8     10-16    136  |  99  |  15  ----------------------------<  139<H>  3.5   |  21<L>  |  1.01    Ca    9.5      16 Oct 2022 15:51  Mg     1.90     10-16    TPro  7.1  /  Alb  4.3  /  TBili  0.5  /  DBili  x   /  AST  21  /  ALT  20  /  AlkPhos  64  10-16        CARDIAC MARKERS ( 16 Oct 2022 15:51 )  x     / x     / x     / x     / 4.7 ng/mL      Serum Pro-Brain Natriuretic Peptide: 45 pg/mL (10-16 @ 15:51)        RADIOLOGY, EKG & ADDITIONAL TESTS: Reviewed.

## 2022-10-16 NOTE — H&P ADULT - HISTORY OF PRESENT ILLNESS
This is a 61 yo M PMH current smoker presenting with acute onset mid sternal chest pain with B/L UE numbness s/p viagra use, found to hae LUKASZ in ____. Patient described L sided chest pain with b/l UE numbness. Pain improving s/p fluid resuscitation.   This is a 61 yo M PMH HLD, hypothyroidism, current smoker presenting with acute onset mid sternal chest pain with B/L UE numbness s/p viagra use, found to have LUKASZ in V1-6 and II with T wave inversion in III. Patient's wife states 15 minutes s/p sexual intercourse, pt started having severe crushing chest pain with dizziness and diaphoresis. Pt became less alert and wife used ice water to wake him without success, so she called EMS. Pt received ASA and brilinta load in ED as well as 400 U heparin. Pt was taken to cath lab with findings: ___. Intraortic balloon pump placed for EF 25%, to keep RCA and CX patent until staging, and for hypotension. Pt planned for staged PCI to RCA and Cx this week.                                                                                                                                                                                       This is a 63 yo M PMH HLD, hypothyroidism, current smoker presenting with acute onset mid sternal chest pain with B/L UE numbness s/p viagra use, found to have LUKASZ in V1-6 and II with T wave inversion in III. Patient's wife states 15 minutes s/p sexual intercourse, pt started having severe crushing chest pain with dizziness and diaphoresis. Pt became less alert and wife used ice water to wake him without success, so she called EMS. Pt received ASA and brilinta load in ED as well as 400 U heparin. Pt was taken to cath lab with findings: ___. Intraortic balloon pump placed for EF 25%, to keep RCA and CX patent until staging, and for hypotension. Pt planned for staged PCI to RCA and Cx this week.

## 2022-10-16 NOTE — H&P ADULT - NSHPREVIEWOFSYSTEMS_GEN_ALL_CORE
REVIEW OF SYSTEMS:  CONSTITUTIONAL: No weakness, fevers or chills  EYES/ENT: No visual changes;  No vertigo or throat pain   NECK: No pain or stiffness  RESPIRATORY: No cough, wheezing, hemoptysis; No shortness of breath  CARDIOVASCULAR: + chest pain, no palpitations  GASTROINTESTINAL: No abdominal or epigastric pain. No nausea, vomiting, or hematemesis; No diarrhea or constipation. No melena or hematochezia.  GENITOURINARY: No dysuria, frequency or hematuria  NEUROLOGICAL: No numbness or weakness  SKIN: No itching, rashes

## 2022-10-17 LAB
ALBUMIN SERPL ELPH-MCNC: 3.7 G/DL — SIGNIFICANT CHANGE UP (ref 3.3–5)
ALP SERPL-CCNC: 54 U/L — SIGNIFICANT CHANGE UP (ref 40–120)
ALT FLD-CCNC: 51 U/L — HIGH (ref 4–41)
ANION GAP SERPL CALC-SCNC: 12 MMOL/L — SIGNIFICANT CHANGE UP (ref 7–14)
ANION GAP SERPL CALC-SCNC: 12 MMOL/L — SIGNIFICANT CHANGE UP (ref 7–14)
APTT BLD: 49.4 SEC — HIGH (ref 27–36.3)
APTT BLD: 53.1 SEC — HIGH (ref 27–36.3)
APTT BLD: 53.1 SEC — HIGH (ref 27–36.3)
AST SERPL-CCNC: 194 U/L — HIGH (ref 4–40)
BASE EXCESS BLDV CALC-SCNC: -0.2 MMOL/L — SIGNIFICANT CHANGE UP (ref -2–3)
BILIRUB SERPL-MCNC: 0.5 MG/DL — SIGNIFICANT CHANGE UP (ref 0.2–1.2)
BLOOD GAS ARTERIAL - LYTES,HGB,ICA,LACT RESULT: SIGNIFICANT CHANGE UP
BUN SERPL-MCNC: 12 MG/DL — SIGNIFICANT CHANGE UP (ref 7–23)
BUN SERPL-MCNC: 14 MG/DL — SIGNIFICANT CHANGE UP (ref 7–23)
CA-I SERPL-SCNC: 1.15 MMOL/L — SIGNIFICANT CHANGE UP (ref 1.15–1.33)
CALCIUM SERPL-MCNC: 8.8 MG/DL — SIGNIFICANT CHANGE UP (ref 8.4–10.5)
CALCIUM SERPL-MCNC: 9.3 MG/DL — SIGNIFICANT CHANGE UP (ref 8.4–10.5)
CHLORIDE BLDV-SCNC: 101 MMOL/L — SIGNIFICANT CHANGE UP (ref 96–108)
CHLORIDE SERPL-SCNC: 101 MMOL/L — SIGNIFICANT CHANGE UP (ref 98–107)
CHLORIDE SERPL-SCNC: 99 MMOL/L — SIGNIFICANT CHANGE UP (ref 98–107)
CK MB BLD-MCNC: 11.1 % — HIGH (ref 0–2.5)
CK MB CFR SERPL CALC: 294 NG/ML — HIGH
CK MB CFR SERPL CALC: 84.6 NG/ML — HIGH
CK SERPL-CCNC: 2658 U/L — HIGH (ref 30–200)
CO2 BLDV-SCNC: 23.6 MMOL/L — SIGNIFICANT CHANGE UP (ref 22–26)
CO2 SERPL-SCNC: 21 MMOL/L — LOW (ref 22–31)
CO2 SERPL-SCNC: 21 MMOL/L — LOW (ref 22–31)
CREAT SERPL-MCNC: 0.7 MG/DL — SIGNIFICANT CHANGE UP (ref 0.5–1.3)
CREAT SERPL-MCNC: 0.72 MG/DL — SIGNIFICANT CHANGE UP (ref 0.5–1.3)
EGFR: 105 ML/MIN/1.73M2 — SIGNIFICANT CHANGE UP
EGFR: 105 ML/MIN/1.73M2 — SIGNIFICANT CHANGE UP
GAS PNL BLDV: 130 MMOL/L — LOW (ref 136–145)
GAS PNL BLDV: SIGNIFICANT CHANGE UP
GLUCOSE BLDV-MCNC: 138 MG/DL — HIGH (ref 70–99)
GLUCOSE SERPL-MCNC: 140 MG/DL — HIGH (ref 70–99)
GLUCOSE SERPL-MCNC: 146 MG/DL — HIGH (ref 70–99)
HCO3 BLDV-SCNC: 23 MMOL/L — SIGNIFICANT CHANGE UP (ref 22–29)
HCT VFR BLD CALC: 39.6 % — SIGNIFICANT CHANGE UP (ref 39–50)
HCT VFR BLD CALC: 43.1 % — SIGNIFICANT CHANGE UP (ref 39–50)
HCT VFR BLDA CALC: 42 % — SIGNIFICANT CHANGE UP (ref 39–51)
HCV AB S/CO SERPL IA: 0.15 S/CO — SIGNIFICANT CHANGE UP (ref 0–0.99)
HCV AB SERPL-IMP: SIGNIFICANT CHANGE UP
HGB BLD CALC-MCNC: 13.9 G/DL — SIGNIFICANT CHANGE UP (ref 13–17)
HGB BLD-MCNC: 13.7 G/DL — SIGNIFICANT CHANGE UP (ref 13–17)
HGB BLD-MCNC: 14.4 G/DL — SIGNIFICANT CHANGE UP (ref 13–17)
INR BLD: 1.11 RATIO — SIGNIFICANT CHANGE UP (ref 0.88–1.16)
LACTATE BLDV-MCNC: 1.6 MMOL/L — SIGNIFICANT CHANGE UP (ref 0.5–2)
MAGNESIUM SERPL-MCNC: 2.5 MG/DL — SIGNIFICANT CHANGE UP (ref 1.6–2.6)
MCHC RBC-ENTMCNC: 30.4 PG — SIGNIFICANT CHANGE UP (ref 27–34)
MCHC RBC-ENTMCNC: 31.7 PG — SIGNIFICANT CHANGE UP (ref 27–34)
MCHC RBC-ENTMCNC: 33.4 GM/DL — SIGNIFICANT CHANGE UP (ref 32–36)
MCHC RBC-ENTMCNC: 34.6 GM/DL — SIGNIFICANT CHANGE UP (ref 32–36)
MCV RBC AUTO: 91.1 FL — SIGNIFICANT CHANGE UP (ref 80–100)
MCV RBC AUTO: 91.7 FL — SIGNIFICANT CHANGE UP (ref 80–100)
NRBC # BLD: 0 /100 WBCS — SIGNIFICANT CHANGE UP (ref 0–0)
NRBC # BLD: 0 /100 WBCS — SIGNIFICANT CHANGE UP (ref 0–0)
NRBC # FLD: 0 K/UL — SIGNIFICANT CHANGE UP (ref 0–0)
NRBC # FLD: 0 K/UL — SIGNIFICANT CHANGE UP (ref 0–0)
PCO2 BLDV: 31 MMHG — LOW (ref 42–55)
PH BLDV: 7.47 — HIGH (ref 7.32–7.43)
PHOSPHATE SERPL-MCNC: 3.2 MG/DL — SIGNIFICANT CHANGE UP (ref 2.5–4.5)
PLATELET # BLD AUTO: 185 K/UL — SIGNIFICANT CHANGE UP (ref 150–400)
PLATELET # BLD AUTO: 220 K/UL — SIGNIFICANT CHANGE UP (ref 150–400)
PO2 BLDV: 88 MMHG — SIGNIFICANT CHANGE UP
POTASSIUM BLDV-SCNC: 3.5 MMOL/L — SIGNIFICANT CHANGE UP (ref 3.5–5.1)
POTASSIUM SERPL-MCNC: 3.7 MMOL/L — SIGNIFICANT CHANGE UP (ref 3.5–5.3)
POTASSIUM SERPL-MCNC: 4.4 MMOL/L — SIGNIFICANT CHANGE UP (ref 3.5–5.3)
POTASSIUM SERPL-SCNC: 3.7 MMOL/L — SIGNIFICANT CHANGE UP (ref 3.5–5.3)
POTASSIUM SERPL-SCNC: 4.4 MMOL/L — SIGNIFICANT CHANGE UP (ref 3.5–5.3)
PROT SERPL-MCNC: 5.9 G/DL — LOW (ref 6–8.3)
PROTHROM AB SERPL-ACNC: 12.9 SEC — SIGNIFICANT CHANGE UP (ref 10.5–13.4)
RBC # BLD: 4.32 M/UL — SIGNIFICANT CHANGE UP (ref 4.2–5.8)
RBC # BLD: 4.73 M/UL — SIGNIFICANT CHANGE UP (ref 4.2–5.8)
RBC # FLD: 12.7 % — SIGNIFICANT CHANGE UP (ref 10.3–14.5)
RBC # FLD: 13.1 % — SIGNIFICANT CHANGE UP (ref 10.3–14.5)
SAO2 % BLDV: 97.8 % — SIGNIFICANT CHANGE UP
SODIUM SERPL-SCNC: 132 MMOL/L — LOW (ref 135–145)
SODIUM SERPL-SCNC: 134 MMOL/L — LOW (ref 135–145)
TROPONIN T, HIGH SENSITIVITY RESULT: 4696 NG/L — CRITICAL HIGH
TROPONIN T, HIGH SENSITIVITY RESULT: 7845 NG/L — CRITICAL HIGH
WBC # BLD: 13.28 K/UL — HIGH (ref 3.8–10.5)
WBC # BLD: 13.83 K/UL — HIGH (ref 3.8–10.5)
WBC # FLD AUTO: 13.28 K/UL — HIGH (ref 3.8–10.5)
WBC # FLD AUTO: 13.83 K/UL — HIGH (ref 3.8–10.5)

## 2022-10-17 PROCEDURE — 99024 POSTOP FOLLOW-UP VISIT: CPT

## 2022-10-17 PROCEDURE — 99291 CRITICAL CARE FIRST HOUR: CPT | Mod: GC

## 2022-10-17 PROCEDURE — 93306 TTE W/DOPPLER COMPLETE: CPT | Mod: 26

## 2022-10-17 PROCEDURE — 71045 X-RAY EXAM CHEST 1 VIEW: CPT | Mod: 26

## 2022-10-17 RX ORDER — MAGNESIUM SULFATE 500 MG/ML
2 VIAL (ML) INJECTION ONCE
Refills: 0 | Status: COMPLETED | OUTPATIENT
Start: 2022-10-17 | End: 2022-10-17

## 2022-10-17 RX ORDER — HEPARIN SODIUM 5000 [USP'U]/ML
1300 INJECTION INTRAVENOUS; SUBCUTANEOUS
Qty: 25000 | Refills: 0 | Status: DISCONTINUED | OUTPATIENT
Start: 2022-10-17 | End: 2022-10-21

## 2022-10-17 RX ORDER — POTASSIUM CHLORIDE 20 MEQ
40 PACKET (EA) ORAL ONCE
Refills: 0 | Status: COMPLETED | OUTPATIENT
Start: 2022-10-17 | End: 2022-10-17

## 2022-10-17 RX ORDER — ACETAMINOPHEN 500 MG
650 TABLET ORAL ONCE
Refills: 0 | Status: COMPLETED | OUTPATIENT
Start: 2022-10-17 | End: 2022-10-17

## 2022-10-17 RX ORDER — SODIUM CHLORIDE 9 MG/ML
250 INJECTION INTRAMUSCULAR; INTRAVENOUS; SUBCUTANEOUS ONCE
Refills: 0 | Status: COMPLETED | OUTPATIENT
Start: 2022-10-17 | End: 2022-10-17

## 2022-10-17 RX ORDER — MORPHINE SULFATE 50 MG/1
1 CAPSULE, EXTENDED RELEASE ORAL ONCE
Refills: 0 | Status: DISCONTINUED | OUTPATIENT
Start: 2022-10-17 | End: 2022-10-17

## 2022-10-17 RX ORDER — TRAMADOL HYDROCHLORIDE 50 MG/1
25 TABLET ORAL ONCE
Refills: 0 | Status: DISCONTINUED | OUTPATIENT
Start: 2022-10-17 | End: 2022-10-17

## 2022-10-17 RX ORDER — SODIUM CHLORIDE 9 MG/ML
500 INJECTION INTRAMUSCULAR; INTRAVENOUS; SUBCUTANEOUS ONCE
Refills: 0 | Status: COMPLETED | OUTPATIENT
Start: 2022-10-17 | End: 2022-10-17

## 2022-10-17 RX ORDER — NOREPINEPHRINE BITARTRATE/D5W 8 MG/250ML
0.05 PLASTIC BAG, INJECTION (ML) INTRAVENOUS
Qty: 8 | Refills: 0 | Status: DISCONTINUED | OUTPATIENT
Start: 2022-10-17 | End: 2022-10-20

## 2022-10-17 RX ADMIN — Medication 1 PATCH: at 07:58

## 2022-10-17 RX ADMIN — Medication 1 PATCH: at 20:04

## 2022-10-17 RX ADMIN — ATORVASTATIN CALCIUM 80 MILLIGRAM(S): 80 TABLET, FILM COATED ORAL at 21:20

## 2022-10-17 RX ADMIN — Medication 25 GRAM(S): at 00:30

## 2022-10-17 RX ADMIN — Medication 1 PATCH: at 19:57

## 2022-10-17 RX ADMIN — TRAMADOL HYDROCHLORIDE 25 MILLIGRAM(S): 50 TABLET ORAL at 00:59

## 2022-10-17 RX ADMIN — MORPHINE SULFATE 1 MILLIGRAM(S): 50 CAPSULE, EXTENDED RELEASE ORAL at 02:14

## 2022-10-17 RX ADMIN — HEPARIN SODIUM 12 UNIT(S)/HR: 5000 INJECTION INTRAVENOUS; SUBCUTANEOUS at 03:01

## 2022-10-17 RX ADMIN — MORPHINE SULFATE 1 MILLIGRAM(S): 50 CAPSULE, EXTENDED RELEASE ORAL at 02:44

## 2022-10-17 RX ADMIN — Medication 9.66 MICROGRAM(S)/KG/MIN: at 18:21

## 2022-10-17 RX ADMIN — Medication 9.66 MICROGRAM(S)/KG/MIN: at 20:01

## 2022-10-17 RX ADMIN — SODIUM CHLORIDE 750 MILLILITER(S): 9 INJECTION INTRAMUSCULAR; INTRAVENOUS; SUBCUTANEOUS at 18:21

## 2022-10-17 RX ADMIN — HEPARIN SODIUM 15 UNIT(S)/HR: 5000 INJECTION INTRAVENOUS; SUBCUTANEOUS at 21:30

## 2022-10-17 RX ADMIN — TICAGRELOR 90 MILLIGRAM(S): 90 TABLET ORAL at 18:21

## 2022-10-17 RX ADMIN — SODIUM CHLORIDE 500 MILLILITER(S): 9 INJECTION INTRAMUSCULAR; INTRAVENOUS; SUBCUTANEOUS at 16:20

## 2022-10-17 RX ADMIN — Medication 81 MILLIGRAM(S): at 11:32

## 2022-10-17 RX ADMIN — Medication 650 MILLIGRAM(S): at 06:19

## 2022-10-17 RX ADMIN — Medication 650 MILLIGRAM(S): at 06:49

## 2022-10-17 RX ADMIN — Medication 1 PATCH: at 20:02

## 2022-10-17 RX ADMIN — HEPARIN SODIUM 13 UNIT(S)/HR: 5000 INJECTION INTRAVENOUS; SUBCUTANEOUS at 16:25

## 2022-10-17 RX ADMIN — HEPARIN SODIUM 13 UNIT(S)/HR: 5000 INJECTION INTRAVENOUS; SUBCUTANEOUS at 20:01

## 2022-10-17 RX ADMIN — TICAGRELOR 90 MILLIGRAM(S): 90 TABLET ORAL at 06:19

## 2022-10-17 RX ADMIN — Medication 40 MILLIEQUIVALENT(S): at 18:44

## 2022-10-17 RX ADMIN — TRAMADOL HYDROCHLORIDE 25 MILLIGRAM(S): 50 TABLET ORAL at 01:29

## 2022-10-17 NOTE — PROGRESS NOTE ADULT - ASSESSMENT
61 yo M PMH current smoker presenting with acute onset mid sternal chest pain with B/L UE numbness s/p viagra use, found to hae LUKASZ in V1-6 and II with T wave inversions in III, s/p NORMAN pLAD with planned staging to RCA and Cx 10/17.     NEURO  -No issues  -A/O 3    RESP  -No issues  -Satting well on RA    CV  #STEMI  -EKG NSR with LUKASZ in V1-6 and II with T wave inversions in III  -S/p cath with significant occlusions in RCA and Cx, 99% occlusion of proximal LAD s/p PCI  -S/p ASA and brilinta load 10/16. C/w daily DAPT  -C/w heparin gtt   -F/u A1c, TSH, lipid panel  -Daily CXR for intraaortic balloon pump  -Trend Trop (11 at admission) and CKMB  -Pt with AIVR which have resolved s/p Mg     #CAD  -Atorvastatin 80 mg daily     GI  -No issues  -DASH diet    /Renal  -No issues    ENDO  -F/u A1c  -Pt with known hypothyroidism, on levothyroxine 25    ID  #Leukocytosis  -WBC 17 on admission, downtrended    Preventive   #DVTppx  -On heparin gtt     #Tobacco cessation  -Nicotine patch, smoking cessation counseling and resources offered

## 2022-10-17 NOTE — PROGRESS NOTE ADULT - SUBJECTIVE AND OBJECTIVE BOX
Patient is a 62y old  Male who presents with a chief complaint of STEMI (16 Oct 2022 16:15)    HPI:  This is a 61 yo M PMH HLD, hypothyroidism, current smoker presenting with acute onset mid sternal chest pain with B/L UE numbness s/p viagra use, found to have LUKASZ in V1-6 and II with T wave inversion in III. Patient's wife states 15 minutes s/p sexual intercourse, pt started having severe crushing chest pain with dizziness and diaphoresis. Pt became less alert and wife used ice water to wake him without success, so she called EMS. Pt received ASA and brilinta load in ED as well as 400 U heparin. Pt was taken to cath lab with findings: ___. Intraortic balloon pump placed for EF 25%, to keep RCA and CX patent until staging, and for hypotension. Pt planned for staged PCI to RCA and Cx this week.                                                                                                                                                                                       (16 Oct 2022 16:15)       INTERVAL HPI/OVERNIGHT EVENTS:   No overnight events   Afebrile, hemodynamically stable     Subjective:    ICU Vital Signs Last 24 Hrs  T(C): 36.6 (17 Oct 2022 04:00), Max: 36.7 (16 Oct 2022 20:00)  T(F): 97.9 (17 Oct 2022 04:00), Max: 98 (16 Oct 2022 20:00)  HR: 62 (17 Oct 2022 07:00) (58 - 89)  BP: 92/75 (16 Oct 2022 20:00) (92/75 - 118/71)  BP(mean): 82 (16 Oct 2022 20:00) (82 - 85)  ABP: 98/69 (17 Oct 2022 07:00) (88/61 - 107/56)  ABP(mean): 88 (17 Oct 2022 07:00) (78 - 92)  RR: 15 (17 Oct 2022 07:00) (12 - 25)  SpO2: 97% (17 Oct 2022 07:00) (95% - 100%)    O2 Parameters below as of 17 Oct 2022 07:00  Patient On (Oxygen Delivery Method): room air          I&O's Summary    16 Oct 2022 07:01  -  17 Oct 2022 07:00  --------------------------------------------------------  IN: 580 mL / OUT: 850 mL / NET: -270 mL          Daily Height in cm: 185.42 (16 Oct 2022 17:50)    Daily Weight in k.2 (17 Oct 2022 05:00)    EKG/Telemetry Events: AIVR 5-6 beats    MEDICATIONS  (STANDING):  aspirin enteric coated 81 milliGRAM(s) Oral daily  atorvastatin 80 milliGRAM(s) Oral at bedtime  heparin  Infusion 1000 Unit(s)/Hr (12 mL/Hr) IV Continuous <Continuous>  nicotine -  14 mG/24Hr(s) Patch 1 Patch Transdermal daily  ticagrelor 90 milliGRAM(s) Oral every 12 hours    MEDICATIONS  (PRN):      PHYSICAL EXAM:  GENERAL: NAD, A/O 3  HEAD:  Atraumatic, Normocephalic  EYES: EOMI, PERRLA, conjunctiva and sclera clear  NECK: Supple, No JVD, Normal thyroid, no enlarged nodes  NERVOUS SYSTEM:  Alert & Awake.   CHEST/LUNG: B/L good air entry; No rales, rhonchi, or wheezing  HEART: S1S2 normal, no S3, Regular rate and rhythm; No murmurs  ABDOMEN: Soft, Nontender, Nondistended; Bowel sounds present  EXTREMITIES:  2+ Peripheral Pulses, No clubbing, cyanosis, or edema  LYMPH: No lymphadenopathy noted  SKIN: No rashes or lesions    LABS:                        14.4   13.83 )-----------( 220      ( 17 Oct 2022 02:20 )             43.1     10-17    132<L>  |  99  |  14  ----------------------------<  146<H>  4.4   |  21<L>  |  0.70    Ca    9.3      17 Oct 2022 02:20  Phos  3.2     10-17  Mg     2.50     10-    TPro  7.1  /  Alb  4.3  /  TBili  0.5  /  DBili  x   /  AST  21  /  ALT  20  /  AlkPhos  64  10-16    LIVER FUNCTIONS - ( 16 Oct 2022 15:51 )  Alb: 4.3 g/dL / Pro: 7.1 g/dL / ALK PHOS: 64 U/L / ALT: 20 U/L / AST: 21 U/L / GGT: x           PT/INR - ( 17 Oct 2022 02:20 )   PT: 12.9 sec;   INR: 1.11 ratio         PTT - ( 17 Oct 2022 02:20 )  PTT:49.4 sec  CAPILLARY BLOOD GLUCOSE        ABG - ( 17 Oct 2022 02:20 )  pH, Arterial: 7.42  pH, Blood: x     /  pCO2: 33    /  pO2: 123   / HCO3: 21    / Base Excess: -2.2  /  SaO2: 99.2              CKMB Units: 294.0 ng/mL (10-17 @ 02:20)  Creatine Kinase, Serum: 2658 U/L (10-17 @ 02:20)  CKMB Units: 264.9 ng/mL (10-16 @ 21:47)  Creatine Kinase, Serum: 2890 U/L (10-16 @ 21:47)  CKMB Units: 4.7 ng/mL (10-16 @ 15:51)    CARDIAC MARKERS ( 17 Oct 2022 02:20 )  x     / x     / 2658 U/L / x     / 294.0 ng/mL  CARDIAC MARKERS ( 16 Oct 2022 21:47 )  x     / x     / 2890 U/L / x     / 264.9 ng/mL  CARDIAC MARKERS ( 16 Oct 2022 15:51 )  x     / x     / x     / x     / 4.7 ng/mL            RADIOLOGY & ADDITIONAL TESTS:  CXR:        Care Discussed with Consultants/Other Providers [ x] YES  [ ] NO

## 2022-10-17 NOTE — CHART NOTE - NSCHARTNOTEFT_GEN_A_CORE
Patient hypotensive at 4pm. IABP augmentation 81. NS 500cc bolus given x 1, NS 250cc x 1 given. Levophed gtt initiated. Patient is now on 0.04 mcg/kg of levophed, Augmented pressure improving to >100. WIll continue to monitor. IF patient becomes unstable, cardiology fellow to be notified/ SHOCk team to be activated. For now, patient is hemodynamically stable. Cardiac troponin downtrending. EKG unchanged. CPKs downtrending.

## 2022-10-18 LAB
ANION GAP SERPL CALC-SCNC: 11 MMOL/L — SIGNIFICANT CHANGE UP (ref 7–14)
APTT BLD: 68.4 SEC — HIGH (ref 27–36.3)
BLOOD GAS ARTERIAL - LYTES,HGB,ICA,LACT RESULT: SIGNIFICANT CHANGE UP
BUN SERPL-MCNC: 11 MG/DL — SIGNIFICANT CHANGE UP (ref 7–23)
CALCIUM SERPL-MCNC: 8.8 MG/DL — SIGNIFICANT CHANGE UP (ref 8.4–10.5)
CHLORIDE SERPL-SCNC: 103 MMOL/L — SIGNIFICANT CHANGE UP (ref 98–107)
CK MB BLD-MCNC: 5 % — HIGH (ref 0–2.5)
CK MB CFR SERPL CALC: 34.1 NG/ML — HIGH
CK SERPL-CCNC: 679 U/L — HIGH (ref 30–200)
CO2 SERPL-SCNC: 21 MMOL/L — LOW (ref 22–31)
CREAT SERPL-MCNC: 0.78 MG/DL — SIGNIFICANT CHANGE UP (ref 0.5–1.3)
EGFR: 102 ML/MIN/1.73M2 — SIGNIFICANT CHANGE UP
GLUCOSE SERPL-MCNC: 169 MG/DL — HIGH (ref 70–99)
HCT VFR BLD CALC: 39.7 % — SIGNIFICANT CHANGE UP (ref 39–50)
HGB BLD-MCNC: 13.6 G/DL — SIGNIFICANT CHANGE UP (ref 13–17)
INR BLD: 1.14 RATIO — SIGNIFICANT CHANGE UP (ref 0.88–1.16)
LACTATE BLDA-MCNC: 1.3 MMOL/L — SIGNIFICANT CHANGE UP (ref 0.5–2)
MAGNESIUM SERPL-MCNC: 1.9 MG/DL — SIGNIFICANT CHANGE UP (ref 1.6–2.6)
MCHC RBC-ENTMCNC: 31.4 PG — SIGNIFICANT CHANGE UP (ref 27–34)
MCHC RBC-ENTMCNC: 34.3 GM/DL — SIGNIFICANT CHANGE UP (ref 32–36)
MCV RBC AUTO: 91.7 FL — SIGNIFICANT CHANGE UP (ref 80–100)
NRBC # BLD: 0 /100 WBCS — SIGNIFICANT CHANGE UP (ref 0–0)
NRBC # FLD: 0 K/UL — SIGNIFICANT CHANGE UP (ref 0–0)
PHOSPHATE SERPL-MCNC: 2 MG/DL — LOW (ref 2.5–4.5)
PLATELET # BLD AUTO: 195 K/UL — SIGNIFICANT CHANGE UP (ref 150–400)
POTASSIUM SERPL-MCNC: 4.1 MMOL/L — SIGNIFICANT CHANGE UP (ref 3.5–5.3)
POTASSIUM SERPL-SCNC: 4.1 MMOL/L — SIGNIFICANT CHANGE UP (ref 3.5–5.3)
PROTHROM AB SERPL-ACNC: 13.3 SEC — SIGNIFICANT CHANGE UP (ref 10.5–13.4)
RBC # BLD: 4.33 M/UL — SIGNIFICANT CHANGE UP (ref 4.2–5.8)
RBC # FLD: 13.1 % — SIGNIFICANT CHANGE UP (ref 10.3–14.5)
SODIUM SERPL-SCNC: 135 MMOL/L — SIGNIFICANT CHANGE UP (ref 135–145)
TROPONIN T, HIGH SENSITIVITY RESULT: 3464 NG/L — CRITICAL HIGH
WBC # BLD: 13.63 K/UL — HIGH (ref 3.8–10.5)
WBC # FLD AUTO: 13.63 K/UL — HIGH (ref 3.8–10.5)

## 2022-10-18 PROCEDURE — 92978 ENDOLUMINL IVUS OCT C 1ST: CPT | Mod: 26,RC

## 2022-10-18 PROCEDURE — 71045 X-RAY EXAM CHEST 1 VIEW: CPT | Mod: 26,76

## 2022-10-18 PROCEDURE — 92928 PRQ TCAT PLMT NTRAC ST 1 LES: CPT | Mod: RC

## 2022-10-18 PROCEDURE — 99291 CRITICAL CARE FIRST HOUR: CPT | Mod: GC

## 2022-10-18 RX ORDER — MAGNESIUM SULFATE 500 MG/ML
1 VIAL (ML) INJECTION ONCE
Refills: 0 | Status: COMPLETED | OUTPATIENT
Start: 2022-10-18 | End: 2022-10-18

## 2022-10-18 RX ORDER — ACETAMINOPHEN 500 MG
650 TABLET ORAL EVERY 6 HOURS
Refills: 0 | Status: DISCONTINUED | OUTPATIENT
Start: 2022-10-18 | End: 2022-10-23

## 2022-10-18 RX ORDER — LANOLIN ALCOHOL/MO/W.PET/CERES
6 CREAM (GRAM) TOPICAL AT BEDTIME
Refills: 0 | Status: DISCONTINUED | OUTPATIENT
Start: 2022-10-18 | End: 2022-10-23

## 2022-10-18 RX ADMIN — Medication 650 MILLIGRAM(S): at 12:30

## 2022-10-18 RX ADMIN — Medication 1 PATCH: at 07:55

## 2022-10-18 RX ADMIN — TICAGRELOR 90 MILLIGRAM(S): 90 TABLET ORAL at 05:53

## 2022-10-18 RX ADMIN — Medication 81 MILLIGRAM(S): at 09:03

## 2022-10-18 RX ADMIN — Medication 1 PATCH: at 19:00

## 2022-10-18 RX ADMIN — TICAGRELOR 90 MILLIGRAM(S): 90 TABLET ORAL at 18:27

## 2022-10-18 RX ADMIN — ATORVASTATIN CALCIUM 80 MILLIGRAM(S): 80 TABLET, FILM COATED ORAL at 21:09

## 2022-10-18 RX ADMIN — Medication 9.66 MICROGRAM(S)/KG/MIN: at 18:27

## 2022-10-18 RX ADMIN — Medication 1 PATCH: at 20:00

## 2022-10-18 RX ADMIN — Medication 1 PATCH: at 21:08

## 2022-10-18 RX ADMIN — Medication 6 MILLIGRAM(S): at 21:09

## 2022-10-18 RX ADMIN — Medication 100 GRAM(S): at 03:19

## 2022-10-18 RX ADMIN — Medication 650 MILLIGRAM(S): at 11:57

## 2022-10-18 NOTE — PROGRESS NOTE ADULT - ASSESSMENT
61 yo M PMH current smoker presenting with acute onset mid sternal chest pain with B/L UE numbness s/p viagra use, found to hae LUKASZ in V1-6 and II with T wave inversions in III, s/p NORMAN pLAD with planned staging to RCA 95% and Cx 90% 10/17.     NEURO  -No issues  -A/O 3    RESP  -No issues  -Satting well on RA    CV  #STEMI  -EKG NSR with LUKASZ in V1-6 and II with T wave inversions in III  -S/p cath with significant occlusions in RCA and Cx, 99% occlusion of proximal LAD s/p PCI  -S/p ASA and brilinta load 10/16. C/w daily DAPT  -C/w heparin gtt   -F/u A1c, TSH, lipid panel  -Daily CXR for intraaortic balloon pump  -Trend Trop (11 at admission), peaked at 7845 and now downtrending, and CKMB, peaked at 294 and downtrending   -Pt with AIVR which have resolved s/p Mg     #Hypotension  -Started on levophed gtt 10/18  -Augmented pressures in 100s     #CAD  -Atorvastatin 80 mg daily     GI  -No issues  -DASH diet    /Renal  -No issues    ENDO  -F/u A1c  -Pt with known hypothyroidism, on levothyroxine 25    ID  No issues    Preventive   #DVTppx  -On heparin gtt     #Tobacco cessation  -Nicotine patch, smoking cessation counseling and resources offered 63 yo M PMH current smoker presenting with acute onset mid sternal chest pain with B/L UE numbness s/p viagra use, found to hae LUKASZ in V1-6 and II with T wave inversions in III, s/p NORMAN pLAD with planned staging to RCA 95% and Cx 90% 10/17.     NEURO  -No issues  -A/O 3    RESP  -No issues  -Satting well on RA    CV  #STEMI  -EKG NSR with LUKASZ in V1-6 and II with T wave inversions in III  -S/p cath with significant occlusions in RCA and Cx, 99% occlusion of proximal LAD s/p PCI  -S/p ASA and brilinta load 10/16. C/w daily DAPT  -C/w heparin gtt   -F/u A1c, TSH, lipid panel  -Daily CXR for intraaortic balloon pump  -Trend Trop (11 at admission), peaked at 7845 and now downtrending, and CKMB, peaked at 294 and downtrending   -Echo 10/17 with severe segmental LV systolic dysfx and hypokinesis of apex, mid-distal septum and anterior wall   -Pt with AIVR which have resolved s/p Mg     #Hypotension  -Started on levophed gtt 10/18  -Augmented pressures in 100s     #CAD  -Atorvastatin 80 mg daily     GI  -No issues  -DASH diet    /Renal  -No issues    ENDO  -F/u A1c  -Pt with known hypothyroidism, on levothyroxine 25    ID  No issues    Preventive   #DVTppx  -On heparin gtt     #Tobacco cessation  -Nicotine patch, smoking cessation counseling and resources offered 63 yo M PMH current smoker presenting with acute onset mid sternal chest pain with B/L UE numbness s/p viagra use, found to hae LUKASZ in V1-6 and II with T wave inversions in III, s/p NORMAN pLAD with planned staging to RCA 95% and Cx 90% 10/17.     NEURO  -No issues  -A/O 3    RESP  -No issues  -Satting well on RA    CV  #STEMI  -EKG NSR with LUKASZ in V1-6 and II with T wave inversions in III  -S/p cath with significant occlusions in RCA and Cx, 99% occlusion of proximal LAD s/p PCI 10/16 with staged RCA on 10/18   -Medical mngmt/outpatient f/u for Cx lesion  -Pt still with IABP - will wean over 48 hours, c/w heparin gtt and daily CXRs  -S/p ASA and brilinta load 10/16. C/w daily DAPT   -Trop (11 at admission) peaked at 7845 and now downtrending; CKMB, peaked at 294 and now downtrending   -Echo 10/17 with severe segmental LV systolic dysfx and hypokinesis of apex, mid-distal septum and anterior wall   -Pt with AIVR which have resolved s/p Mg     #Hypotension  -Started on levophed gtt 10/18 PM for augmented BPs in 70s. Pt remains on 0.03, will wean over 24 hours  -Augmented pressures now in 80s-100s     #CAD  -Atorvastatin 80 mg daily     GI  -No issues  -DASH diet    /Renal  -No issues    ENDO  -F/u A1c  -Pt with known hypothyroidism, on levothyroxine 25    ID  No issues    Preventive   #DVTppx  -On heparin gtt     #Tobacco cessation  -Nicotine patch, smoking cessation counseling and resources offered

## 2022-10-18 NOTE — PROGRESS NOTE ADULT - SUBJECTIVE AND OBJECTIVE BOX
Patient is a 62y old  Male who presents with a chief complaint of STEMI (17 Oct 2022 08:20)    HPI:  This is a 63 yo M PMH HLD, hypothyroidism, current smoker presenting with acute onset mid sternal chest pain with B/L UE numbness s/p viagra use, found to have LUKASZ in V1-6 and II with T wave inversion in III. Patient's wife states 15 minutes s/p sexual intercourse, pt started having severe crushing chest pain with dizziness and diaphoresis. Pt became less alert and wife used ice water to wake him without success, so she called EMS. Pt received ASA and brilinta load in ED as well as 400 U heparin. Pt was taken to cath lab with findings: ___. Intraortic balloon pump placed for EF 25%, to keep RCA and CX patent until staging, and for hypotension. Pt planned for staged PCI to RCA and Cx this week.                                                                                                                                                                                       (16 Oct 2022 16:15)       INTERVAL HPI/OVERNIGHT EVENTS:   No overnight events   Afebrile, hemodynamically stable     Subjective: No complaints, resting comfortably. Hypotensive yesterday evening, started on levophed    ICU Vital Signs Last 24 Hrs  T(C): 36.7 (18 Oct 2022 04:00), Max: 36.8 (18 Oct 2022 00:00)  T(F): 98.1 (18 Oct 2022 04:00), Max: 98.3 (18 Oct 2022 00:00)  HR: 68 (18 Oct 2022 06:30) (66 - 82)  BP: --  BP(mean): --  ABP: 101/54 (18 Oct 2022 06:30) (90/68 - 133/71)  ABP(mean): 83 (18 Oct 2022 06:30) (73 - 105)  RR: 15 (18 Oct 2022 06:30) (12 - 22)  SpO2: 97% (18 Oct 2022 06:30) (95% - 100%)    O2 Parameters below as of 18 Oct 2022 06:00  Patient On (Oxygen Delivery Method): room air          I&O's Summary    17 Oct 2022 07:01  -  18 Oct 2022 07:00  --------------------------------------------------------  IN: 1370.3 mL / OUT: 3825 mL / NET: -2454.7 mL          Daily     Daily Weight in k.3 (18 Oct 2022 06:00)      EKG/Telemetry Events: No events    MEDICATIONS  (STANDING):  aspirin enteric coated 81 milliGRAM(s) Oral daily  atorvastatin 80 milliGRAM(s) Oral at bedtime  heparin  Infusion 1300 Unit(s)/Hr (15 mL/Hr) IV Continuous <Continuous>  nicotine -  14 mG/24Hr(s) Patch 1 Patch Transdermal daily  norepinephrine Infusion 0.05 MICROgram(s)/kG/Min (9.66 mL/Hr) IV Continuous <Continuous>  ticagrelor 90 milliGRAM(s) Oral every 12 hours    MEDICATIONS  (PRN):      PHYSICAL EXAM:  GENERAL:   HEAD:  Atraumatic, Normocephalic  EYES: EOMI, PERRLA, conjunctiva and sclera clear  NECK: Supple, No JVD, Normal thyroid, no enlarged nodes  NERVOUS SYSTEM:  Alert & Awake.   CHEST/LUNG: B/L good air entry; No rales, rhonchi, or wheezing  HEART: S1S2 normal, no S3, Regular rate and rhythm; No murmurs  ABDOMEN: Soft, Nontender, Nondistended; Bowel sounds present  EXTREMITIES:  2+ Peripheral Pulses, No clubbing, cyanosis, or edema  LYMPH: No lymphadenopathy noted  SKIN: No rashes or lesions    LABS:                        13.6   13.63 )-----------( 195      ( 18 Oct 2022 02:00 )             39.7     10-18    135  |  103  |  11  ----------------------------<  169<H>  4.1   |  21<L>  |  0.78    Ca    8.8      18 Oct 2022 02:00  Phos  2.0     10-18  Mg     1.90     10-18    TPro  5.9<L>  /  Alb  3.7  /  TBili  0.5  /  DBili  x   /  AST  194<H>  /  ALT  51<H>  /  AlkPhos  54  10-17    LIVER FUNCTIONS - ( 17 Oct 2022 17:00 )  Alb: 3.7 g/dL / Pro: 5.9 g/dL / ALK PHOS: 54 U/L / ALT: 51 U/L / AST: 194 U/L / GGT: x           PT/INR - ( 18 Oct 2022 02:00 )   PT: 13.3 sec;   INR: 1.14 ratio         PTT - ( 18 Oct 2022 02:00 )  PTT:68.4 sec  CAPILLARY BLOOD GLUCOSE        ABG - ( 18 Oct 2022 02:00 )  pH, Arterial: 7.43  pH, Blood: x     /  pCO2: 34    /  pO2: 116   / HCO3: 23    / Base Excess: -1.1  /  SaO2: 98.3              CKMB Units: 34.1 ng/mL (10-18 @ 02:00)  Creatine Kinase, Serum: 679 U/L (10-18 @ 02:00)  CKMB Units: 84.6 ng/mL (10-17 @ 17:00)    CARDIAC MARKERS ( 18 Oct 2022 02:00 )  x     / x     / 679 U/L / x     / 34.1 ng/mL  CARDIAC MARKERS ( 17 Oct 2022 17:00 )  x     / x     / x     / x     / 84.6 ng/mL  CARDIAC MARKERS ( 17 Oct 2022 02:20 )  x     / x     / 2658 U/L / x     / 294.0 ng/mL  CARDIAC MARKERS ( 16 Oct 2022 21:47 )  x     / x     / 2890 U/L / x     / 264.9 ng/mL  CARDIAC MARKERS ( 16 Oct 2022 15:51 )  x     / x     / x     / x     / 4.7 ng/mL            RADIOLOGY & ADDITIONAL TESTS:  CXR:        Care Discussed with Consultants/Other Providers [ x] YES  [ ] NO           Patient is a 62y old  Male who presents with a chief complaint of STEMI (17 Oct 2022 08:20)    HPI:  This is a 63 yo M PMH HLD, hypothyroidism, current smoker presenting with acute onset mid sternal chest pain with B/L UE numbness s/p viagra use, found to have LUKASZ in V1-6 and II with T wave inversion in III. Patient's wife states 15 minutes s/p sexual intercourse, pt started having severe crushing chest pain with dizziness and diaphoresis. Pt became less alert and wife used ice water to wake him without success, so she called EMS. Pt received ASA and brilinta load in ED as well as 400 U heparin. Pt was taken to cath lab with findings: ___. Intraortic balloon pump placed for EF 25%, to keep RCA and CX patent until staging, and for hypotension. Pt planned for staged PCI to RCA and Cx this week.                                                                                                                                                                                       (16 Oct 2022 16:15)       INTERVAL HPI/OVERNIGHT EVENTS:   No overnight events   Afebrile, hemodynamically stable     Subjective: No complaints, resting comfortably. Hypotensive yesterday evening, started on levophed    ICU Vital Signs Last 24 Hrs  T(C): 36.7 (18 Oct 2022 04:00), Max: 36.8 (18 Oct 2022 00:00)  T(F): 98.1 (18 Oct 2022 04:00), Max: 98.3 (18 Oct 2022 00:00)  HR: 68 (18 Oct 2022 06:30) (66 - 82)  BP: --  BP(mean): --  ABP: 101/54 (18 Oct 2022 06:30) (90/68 - 133/71)  ABP(mean): 83 (18 Oct 2022 06:30) (73 - 105)  RR: 15 (18 Oct 2022 06:30) (12 - 22)  SpO2: 97% (18 Oct 2022 06:30) (95% - 100%)    O2 Parameters below as of 18 Oct 2022 06:00  Patient On (Oxygen Delivery Method): room air          I&O's Summary    17 Oct 2022 07:01  -  18 Oct 2022 07:00  --------------------------------------------------------  IN: 1370.3 mL / OUT: 3825 mL / NET: -2454.7 mL          Daily     Daily Weight in k.3 (18 Oct 2022 06:00)      EKG/Telemetry Events: No events    MEDICATIONS  (STANDING):  aspirin enteric coated 81 milliGRAM(s) Oral daily  atorvastatin 80 milliGRAM(s) Oral at bedtime  heparin  Infusion 1300 Unit(s)/Hr (15 mL/Hr) IV Continuous <Continuous>  nicotine -  14 mG/24Hr(s) Patch 1 Patch Transdermal daily  norepinephrine Infusion 0.05 MICROgram(s)/kG/Min (9.66 mL/Hr) IV Continuous <Continuous>  ticagrelor 90 milliGRAM(s) Oral every 12 hours    MEDICATIONS  (PRN):      PHYSICAL EXAM:  GENERAL: NAD, A/O 3  HEAD:  Atraumatic, Normocephalic  EYES: EOMI, PERRLA, conjunctiva and sclera clear  NECK: Supple, No JVD, Normal thyroid, no enlarged nodes  NERVOUS SYSTEM:  Alert & Awake.   CHEST/LUNG: B/L good air entry; No rales, rhonchi, or wheezing  HEART: S1S2 normal, no S3, Regular rate and rhythm; No murmurs  ABDOMEN: Soft, Nontender, Nondistended; Bowel sounds present  EXTREMITIES:  2+ Peripheral Pulses, No clubbing, cyanosis, or edema  LYMPH: No lymphadenopathy noted  SKIN: No rashes or lesions    LABS:                        13.6   13.63 )-----------( 195      ( 18 Oct 2022 02:00 )             39.7     10-18    135  |  103  |  11  ----------------------------<  169<H>  4.1   |  21<L>  |  0.78    Ca    8.8      18 Oct 2022 02:00  Phos  2.0     10-18  Mg     1.90     10-18    TPro  5.9<L>  /  Alb  3.7  /  TBili  0.5  /  DBili  x   /  AST  194<H>  /  ALT  51<H>  /  AlkPhos  54  10-17    LIVER FUNCTIONS - ( 17 Oct 2022 17:00 )  Alb: 3.7 g/dL / Pro: 5.9 g/dL / ALK PHOS: 54 U/L / ALT: 51 U/L / AST: 194 U/L / GGT: x           PT/INR - ( 18 Oct 2022 02:00 )   PT: 13.3 sec;   INR: 1.14 ratio         PTT - ( 18 Oct 2022 02:00 )  PTT:68.4 sec  CAPILLARY BLOOD GLUCOSE        ABG - ( 18 Oct 2022 02:00 )  pH, Arterial: 7.43  pH, Blood: x     /  pCO2: 34    /  pO2: 116   / HCO3: 23    / Base Excess: -1.1  /  SaO2: 98.3              CKMB Units: 34.1 ng/mL (10-18 @ 02:00)  Creatine Kinase, Serum: 679 U/L (10-18 @ 02:00)  CKMB Units: 84.6 ng/mL (10-17 @ 17:00)    CARDIAC MARKERS ( 18 Oct 2022 02:00 )  x     / x     / 679 U/L / x     / 34.1 ng/mL  CARDIAC MARKERS ( 17 Oct 2022 17:00 )  x     / x     / x     / x     / 84.6 ng/mL  CARDIAC MARKERS ( 17 Oct 2022 02:20 )  x     / x     / 2658 U/L / x     / 294.0 ng/mL  CARDIAC MARKERS ( 16 Oct 2022 21:47 )  x     / x     / 2890 U/L / x     / 264.9 ng/mL  CARDIAC MARKERS ( 16 Oct 2022 15:51 )  x     / x     / x     / x     / 4.7 ng/mL            RADIOLOGY & ADDITIONAL TESTS:  CXR:        Care Discussed with Consultants/Other Providers [ x] YES  [ ] NO

## 2022-10-18 NOTE — CHART NOTE - NSCHARTNOTEFT_GEN_A_CORE
RADIAL BAND REMOVAL NOTE    Right radial band removed  after 2nd attempt without any complications. No bleeding or hematoma. Positive peripheral pulses. Good capillary refill. RN to monitor site for bleeding and hematoma.

## 2022-10-18 NOTE — PROGRESS NOTE ADULT - CRITICAL CARE ATTENDING COMMENT
Continue IABP support and heparin gtt.  Staged PCI tomorrow.
Now s/p PCI with stents x2 to RCA.  Titrate off norepinephrine.  Maintain IABP support.  Once norepinephrine is off, can decrease IABP support to 1:2, likely tomorrow.

## 2022-10-19 LAB
A1C WITH ESTIMATED AVERAGE GLUCOSE RESULT: 5.7 % — HIGH (ref 4–5.6)
ANION GAP SERPL CALC-SCNC: 14 MMOL/L — SIGNIFICANT CHANGE UP (ref 7–14)
APTT BLD: 59 SEC — HIGH (ref 27–36.3)
APTT BLD: 59.8 SEC — HIGH (ref 27–36.3)
BLOOD GAS ARTERIAL COMPREHENSIVE RESULT: SIGNIFICANT CHANGE UP
BLOOD GAS ARTERIAL COMPREHENSIVE RESULT: SIGNIFICANT CHANGE UP
BUN SERPL-MCNC: 13 MG/DL — SIGNIFICANT CHANGE UP (ref 7–23)
CALCIUM SERPL-MCNC: 9 MG/DL — SIGNIFICANT CHANGE UP (ref 8.4–10.5)
CHLORIDE SERPL-SCNC: 103 MMOL/L — SIGNIFICANT CHANGE UP (ref 98–107)
CHOLEST SERPL-MCNC: 166 MG/DL — SIGNIFICANT CHANGE UP
CK SERPL-CCNC: 265 U/L — HIGH (ref 30–200)
CO2 SERPL-SCNC: 19 MMOL/L — LOW (ref 22–31)
CREAT SERPL-MCNC: 0.78 MG/DL — SIGNIFICANT CHANGE UP (ref 0.5–1.3)
EGFR: 102 ML/MIN/1.73M2 — SIGNIFICANT CHANGE UP
ESTIMATED AVERAGE GLUCOSE: 117 — SIGNIFICANT CHANGE UP
GLUCOSE SERPL-MCNC: 139 MG/DL — HIGH (ref 70–99)
HCT VFR BLD CALC: 38.9 % — LOW (ref 39–50)
HDLC SERPL-MCNC: 30 MG/DL — LOW
HGB BLD-MCNC: 12.9 G/DL — LOW (ref 13–17)
INR BLD: 1.14 RATIO — SIGNIFICANT CHANGE UP (ref 0.88–1.16)
LIPID PNL WITH DIRECT LDL SERPL: 96 MG/DL — SIGNIFICANT CHANGE UP
MAGNESIUM SERPL-MCNC: 1.9 MG/DL — SIGNIFICANT CHANGE UP (ref 1.6–2.6)
MCHC RBC-ENTMCNC: 30.9 PG — SIGNIFICANT CHANGE UP (ref 27–34)
MCHC RBC-ENTMCNC: 33.2 GM/DL — SIGNIFICANT CHANGE UP (ref 32–36)
MCV RBC AUTO: 93.1 FL — SIGNIFICANT CHANGE UP (ref 80–100)
NON HDL CHOLESTEROL: 136 MG/DL — HIGH
NRBC # BLD: 0 /100 WBCS — SIGNIFICANT CHANGE UP (ref 0–0)
NRBC # FLD: 0 K/UL — SIGNIFICANT CHANGE UP (ref 0–0)
PHOSPHATE SERPL-MCNC: 3.3 MG/DL — SIGNIFICANT CHANGE UP (ref 2.5–4.5)
PLATELET # BLD AUTO: 179 K/UL — SIGNIFICANT CHANGE UP (ref 150–400)
POTASSIUM SERPL-MCNC: 3.9 MMOL/L — SIGNIFICANT CHANGE UP (ref 3.5–5.3)
POTASSIUM SERPL-SCNC: 3.9 MMOL/L — SIGNIFICANT CHANGE UP (ref 3.5–5.3)
PROTHROM AB SERPL-ACNC: 13.2 SEC — SIGNIFICANT CHANGE UP (ref 10.5–13.4)
RBC # BLD: 4.18 M/UL — LOW (ref 4.2–5.8)
RBC # FLD: 13.2 % — SIGNIFICANT CHANGE UP (ref 10.3–14.5)
SODIUM SERPL-SCNC: 136 MMOL/L — SIGNIFICANT CHANGE UP (ref 135–145)
T3 SERPL-MCNC: 85 NG/DL — SIGNIFICANT CHANGE UP (ref 80–200)
T4 FREE SERPL-MCNC: 0.9 NG/DL — SIGNIFICANT CHANGE UP (ref 0.9–1.8)
TRIGL SERPL-MCNC: 198 MG/DL — HIGH
TSH SERPL-MCNC: 16.51 UIU/ML — HIGH (ref 0.27–4.2)
WBC # BLD: 12.67 K/UL — HIGH (ref 3.8–10.5)
WBC # FLD AUTO: 12.67 K/UL — HIGH (ref 3.8–10.5)

## 2022-10-19 PROCEDURE — 93308 TTE F-UP OR LMTD: CPT | Mod: 26

## 2022-10-19 PROCEDURE — 93306 TTE W/DOPPLER COMPLETE: CPT | Mod: 26

## 2022-10-19 PROCEDURE — 71045 X-RAY EXAM CHEST 1 VIEW: CPT | Mod: 26

## 2022-10-19 PROCEDURE — 99291 CRITICAL CARE FIRST HOUR: CPT

## 2022-10-19 RX ORDER — LEVOTHYROXINE SODIUM 125 MCG
75 TABLET ORAL DAILY
Refills: 0 | Status: DISCONTINUED | OUTPATIENT
Start: 2022-10-19 | End: 2022-10-23

## 2022-10-19 RX ORDER — EZETIMIBE 10 MG/1
1 TABLET ORAL
Qty: 0 | Refills: 0 | DISCHARGE

## 2022-10-19 RX ORDER — MAGNESIUM SULFATE 500 MG/ML
1 VIAL (ML) INJECTION ONCE
Refills: 0 | Status: COMPLETED | OUTPATIENT
Start: 2022-10-19 | End: 2022-10-19

## 2022-10-19 RX ORDER — FUROSEMIDE 40 MG
20 TABLET ORAL ONCE
Refills: 0 | Status: COMPLETED | OUTPATIENT
Start: 2022-10-19 | End: 2022-10-19

## 2022-10-19 RX ORDER — POTASSIUM CHLORIDE 20 MEQ
20 PACKET (EA) ORAL ONCE
Refills: 0 | Status: COMPLETED | OUTPATIENT
Start: 2022-10-19 | End: 2022-10-19

## 2022-10-19 RX ORDER — LEVOTHYROXINE SODIUM 125 MCG
1 TABLET ORAL
Qty: 0 | Refills: 0 | DISCHARGE

## 2022-10-19 RX ORDER — CHLORHEXIDINE GLUCONATE 213 G/1000ML
1 SOLUTION TOPICAL DAILY
Refills: 0 | Status: DISCONTINUED | OUTPATIENT
Start: 2022-10-19 | End: 2022-10-23

## 2022-10-19 RX ADMIN — HEPARIN SODIUM 15 UNIT(S)/HR: 5000 INJECTION INTRAVENOUS; SUBCUTANEOUS at 07:34

## 2022-10-19 RX ADMIN — Medication 6 MILLIGRAM(S): at 21:54

## 2022-10-19 RX ADMIN — TICAGRELOR 90 MILLIGRAM(S): 90 TABLET ORAL at 17:33

## 2022-10-19 RX ADMIN — Medication 1 PATCH: at 21:40

## 2022-10-19 RX ADMIN — Medication 9.66 MICROGRAM(S)/KG/MIN: at 21:56

## 2022-10-19 RX ADMIN — Medication 20 MILLIGRAM(S): at 11:26

## 2022-10-19 RX ADMIN — Medication 9.66 MICROGRAM(S)/KG/MIN: at 07:33

## 2022-10-19 RX ADMIN — Medication 1 PATCH: at 22:00

## 2022-10-19 RX ADMIN — Medication 1 PATCH: at 21:54

## 2022-10-19 RX ADMIN — Medication 100 GRAM(S): at 06:24

## 2022-10-19 RX ADMIN — Medication 1 PATCH: at 08:41

## 2022-10-19 RX ADMIN — CHLORHEXIDINE GLUCONATE 1 APPLICATION(S): 213 SOLUTION TOPICAL at 17:32

## 2022-10-19 RX ADMIN — TICAGRELOR 90 MILLIGRAM(S): 90 TABLET ORAL at 06:24

## 2022-10-19 RX ADMIN — Medication 81 MILLIGRAM(S): at 11:26

## 2022-10-19 RX ADMIN — Medication 75 MICROGRAM(S): at 15:35

## 2022-10-19 RX ADMIN — Medication 20 MILLIEQUIVALENT(S): at 06:24

## 2022-10-19 RX ADMIN — ATORVASTATIN CALCIUM 80 MILLIGRAM(S): 80 TABLET, FILM COATED ORAL at 21:54

## 2022-10-19 RX ADMIN — HEPARIN SODIUM 15 UNIT(S)/HR: 5000 INJECTION INTRAVENOUS; SUBCUTANEOUS at 21:56

## 2022-10-19 NOTE — PROGRESS NOTE ADULT - SUBJECTIVE AND OBJECTIVE BOX
Patient is a 62y old  Male who presents with a chief complaint of STEMI (18 Oct 2022 08:08)    HPI:  This is a 63 yo M PMH HLD, hypothyroidism, current smoker presenting with acute onset mid sternal chest pain with B/L UE numbness s/p viagra use, found to have LUKASZ in V1-6 and II with T wave inversion in III. Patient's wife states 15 minutes s/p sexual intercourse, pt started having severe crushing chest pain with dizziness and diaphoresis. Pt became less alert and wife used ice water to wake him without success, so she called EMS. Pt received ASA and brilinta load in ED as well as 400 U heparin. Pt was taken to cath lab and found to have 99% occlusion LAD, 95% RCA, 90% Cx s/p DESx1 to LAD with staged DESx2 to RCA, medical mgmt to Cx. Intraortic balloon pump originally placed for EF 25%, to keep RCA and CX patent until staging, and for hypotension. Pt remains with balloon pump given hypotension.                                                                                                                                                                                       (16 Oct 2022 16:15)       INTERVAL HPI/OVERNIGHT EVENTS:   Afebrile, hemodynamically stable     Subjective: Pt noted feeling like he couldn't breathe when sleeping last night. CPAP offered and used for 2 hours without help, then slept through night afterward.     ICU Vital Signs Last 24 Hrs  T(C): 36.9 (19 Oct 2022 05:00), Max: 37.1 (18 Oct 2022 11:31)  T(F): 98.5 (19 Oct 2022 05:00), Max: 98.8 (18 Oct 2022 11:31)  HR: 70 (19 Oct 2022 06:00) (63 - 84)  BP: --  BP(mean): --  ABP: 96/55 (19 Oct 2022 06:00) (83/46 - 119/69)  ABP(mean): 76 (19 Oct 2022 06:00) (63 - 92)  RR: 16 (19 Oct 2022 03:00) (12 - 18)  SpO2: 95% (19 Oct 2022 06:00) (93% - 98%)    O2 Parameters below as of 19 Oct 2022 06:00  Patient On (Oxygen Delivery Method): room air          I&O's Summary    18 Oct 2022 07:01  -  19 Oct 2022 07:00  --------------------------------------------------------  IN: 295.5 mL / OUT: 1875 mL / NET: -1579.5 mL          Daily     Daily Weight in k.9 (19 Oct 2022 05:00)    EKG/Telemetry Events:    MEDICATIONS  (STANDING):  aspirin enteric coated 81 milliGRAM(s) Oral daily  atorvastatin 80 milliGRAM(s) Oral at bedtime  heparin  Infusion 1300 Unit(s)/Hr (15 mL/Hr) IV Continuous <Continuous>  melatonin 6 milliGRAM(s) Oral at bedtime  nicotine -  14 mG/24Hr(s) Patch 1 Patch Transdermal daily  norepinephrine Infusion 0.05 MICROgram(s)/kG/Min (9.66 mL/Hr) IV Continuous <Continuous>  ticagrelor 90 milliGRAM(s) Oral every 12 hours    MEDICATIONS  (PRN):  acetaminophen     Tablet .. 650 milliGRAM(s) Oral every 6 hours PRN Moderate Pain (4 - 6)      PHYSICAL EXAM:  GENERAL: NAD, A/O 3  HEAD:  Atraumatic, Normocephalic  EYES: EOMI, PERRLA, conjunctiva and sclera clear  NECK: Supple, No JVD, Normal thyroid, no enlarged nodes  NERVOUS SYSTEM:  Alert & Awake.   CHEST/LUNG: B/L good air entry; No rales, rhonchi, or wheezing  HEART: S1S2 normal, no S3, Regular rate and rhythm; No murmurs  ABDOMEN: Soft, Nontender, Nondistended; Bowel sounds present  EXTREMITIES:  2+ Peripheral Pulses, No clubbing, cyanosis, or edema  LYMPH: No lymphadenopathy noted  SKIN: No rashes or lesions    LABS:                        12.9   12.67 )-----------( 179      ( 19 Oct 2022 00:24 )             38.9     10-19    136  |  103  |  13  ----------------------------<  139<H>  3.9   |  19<L>  |  0.78    Ca    9.0      19 Oct 2022 00:24  Phos  3.3     10-19  Mg     1.90     10-19    TPro  5.9<L>  /  Alb  3.7  /  TBili  0.5  /  DBili  x   /  AST  194<H>  /  ALT  51<H>  /  AlkPhos  54  10-17    LIVER FUNCTIONS - ( 17 Oct 2022 17:00 )  Alb: 3.7 g/dL / Pro: 5.9 g/dL / ALK PHOS: 54 U/L / ALT: 51 U/L / AST: 194 U/L / GGT: x           PT/INR - ( 19 Oct 2022 00:24 )   PT: 13.2 sec;   INR: 1.14 ratio         PTT - ( 19 Oct 2022 06:20 )  PTT:59.8 sec  CAPILLARY BLOOD GLUCOSE        ABG - ( 19 Oct 2022 06:20 )  pH, Arterial: 7.42  pH, Blood: x     /  pCO2: 35    /  pO2: 88    / HCO3: 23    / Base Excess: -1.3  /  SaO2: 97.6              Creatine Kinase, Serum: 265 U/L (10- @ 00:24)    CARDIAC MARKERS ( 19 Oct 2022 00:24 )  x     / x     / 265 U/L / x     / x      CARDIAC MARKERS ( 18 Oct 2022 02:00 )  x     / x     / 679 U/L / x     / 34.1 ng/mL  CARDIAC MARKERS ( 17 Oct 2022 17:00 )  x     / x     / x     / x     / 84.6 ng/mL            RADIOLOGY & ADDITIONAL TESTS:  CXR:        Care Discussed with Consultants/Other Providers [ x] YES  [ ] NO

## 2022-10-19 NOTE — PROGRESS NOTE ADULT - ASSESSMENT
61 yo M PMH current smoker presenting with acute onset mid sternal chest pain with B/L UE numbness s/p viagra use, found to hae LUKASZ in V1-6 and II with T wave inversions in III, s/p NORMAN pLAD with planned staging to RCA 95% and Cx 90% 10/17.     NEURO  -No issues  -A/O 3    RESP  -No issues  -Satting well on RA    CV  #STEMI  -EKG NSR with LUKASZ in V1-6 and II with T wave inversions in III  -S/p cath with significant occlusions in RCA and Cx, 99% occlusion of proximal LAD s/p PCI (DESx1) 10/16 with staged RCA (DESx2) on 10/18   -Medical mngmt/outpatient f/u for Cx lesion  -Pt still with IABP - will wean over 48 hours, c/w heparin gtt and daily CXRs  -S/p ASA and brilinta load 10/16. C/w daily DAPT   -Trop (11 at admission) peaked at 7845 and now downtrending; CKMB, peaked at 294 and now downtrending   -Echo 10/17 with severe segmental LV systolic dysfx and hypokinesis of apex, mid-distal septum and anterior wall   -Pt with AIVR which have resolved s/p Mg     #Hypotension  -Started on levophed gtt 10/18 PM for augmented BPs in 70s. Pt remains on 0.04, will wean over 24 hours  -Augmented pressures now in 90s-100s, will wean balloon pump at tolerated     #CAD  -Atorvastatin 80 mg daily     GI  -No issues  -DASH diet    /Renal  -No issues    ENDO  -A1c 5.7   -Pt with known hypothyroidism, on levothyroxine 25    ID  No issues    Preventive   #DVTppx  -On heparin gtt     #Tobacco cessation  -Nicotine patch, smoking cessation counseling and resources offered 63 yo M PMH current smoker presenting with acute onset mid sternal chest pain with B/L UE numbness s/p viagra use, found to hae LUKASZ in V1-6 and II with T wave inversions in III, s/p NORMAN pLAD with planned staging to RCA 95% and Cx 90% 10/17.     NEURO  -No issues  -A/O 3    RESP  -No issues  -Satting well on RA    CV  #STEMI  -EKG NSR with LUKASZ in V1-6 and II with T wave inversions in III  -S/p cath with significant occlusions in RCA and Cx, 99% occlusion of proximal LAD s/p PCI (DESx1) 10/16 with staged RCA (DESx2) on 10/18   -Medical mngmt/outpatient f/u for Cx lesion  -Pt still with IABP - 1:1, once levophed weaned, will wean IABP over 48 hours, c/w heparin gtt and daily CXRs  -S/p ASA and brilinta load 10/16. C/w daily DAPT   -Trop (11 at admission) peaked at 7845 and now downtrending; CKMB, peaked at 294 and now downtrending   -Echo 10/17 with severe segmental LV systolic dysfx and hypokinesis of apex, mid-distal septum and anterior wall   -Pt with AIVR which have resolved s/p Mg     #Hypotension  -Started on levophed gtt 10/18 PM for augmented BPs in 90-100s.Unable to wean off 4x over past 24 hours, pt currently on 0.03 given SBP 80s on 0.02, will continue to wean as able  -Augmented pressures now in 90s-100s, will wean levophed and then balloon pump at tolerated     #CAD  -Atorvastatin 80 mg daily     GI  -No issues  -DASH diet    /Renal  -No issues    ENDO  -A1c 5.7   -TSH 16.51, f/u T4  -Pt with known hypothyroidism, on levothyroxine 75    ID  No issues    Preventive   #DVTppx  -On heparin gtt     #Tobacco cessation  -Nicotine patch, smoking cessation counseling and resources offered

## 2022-10-20 LAB
ANION GAP SERPL CALC-SCNC: 12 MMOL/L — SIGNIFICANT CHANGE UP (ref 7–14)
APTT BLD: 64 SEC — HIGH (ref 27–36.3)
BLOOD GAS ARTERIAL COMPREHENSIVE RESULT: SIGNIFICANT CHANGE UP
BUN SERPL-MCNC: 10 MG/DL — SIGNIFICANT CHANGE UP (ref 7–23)
CALCIUM SERPL-MCNC: 9.6 MG/DL — SIGNIFICANT CHANGE UP (ref 8.4–10.5)
CHLORIDE SERPL-SCNC: 98 MMOL/L — SIGNIFICANT CHANGE UP (ref 98–107)
CK SERPL-CCNC: 127 U/L — SIGNIFICANT CHANGE UP (ref 30–200)
CO2 SERPL-SCNC: 21 MMOL/L — LOW (ref 22–31)
CREAT SERPL-MCNC: 0.73 MG/DL — SIGNIFICANT CHANGE UP (ref 0.5–1.3)
EGFR: 104 ML/MIN/1.73M2 — SIGNIFICANT CHANGE UP
GLUCOSE SERPL-MCNC: 133 MG/DL — HIGH (ref 70–99)
HCT VFR BLD CALC: 38.2 % — LOW (ref 39–50)
HGB BLD-MCNC: 13 G/DL — SIGNIFICANT CHANGE UP (ref 13–17)
INR BLD: 1.11 RATIO — SIGNIFICANT CHANGE UP (ref 0.88–1.16)
MAGNESIUM SERPL-MCNC: 1.8 MG/DL — SIGNIFICANT CHANGE UP (ref 1.6–2.6)
MCHC RBC-ENTMCNC: 31.3 PG — SIGNIFICANT CHANGE UP (ref 27–34)
MCHC RBC-ENTMCNC: 34 GM/DL — SIGNIFICANT CHANGE UP (ref 32–36)
MCV RBC AUTO: 92 FL — SIGNIFICANT CHANGE UP (ref 80–100)
NRBC # BLD: 0 /100 WBCS — SIGNIFICANT CHANGE UP (ref 0–0)
NRBC # FLD: 0 K/UL — SIGNIFICANT CHANGE UP (ref 0–0)
PHOSPHATE SERPL-MCNC: 3.6 MG/DL — SIGNIFICANT CHANGE UP (ref 2.5–4.5)
PLATELET # BLD AUTO: 150 K/UL — SIGNIFICANT CHANGE UP (ref 150–400)
POTASSIUM SERPL-MCNC: 3.9 MMOL/L — SIGNIFICANT CHANGE UP (ref 3.5–5.3)
POTASSIUM SERPL-SCNC: 3.9 MMOL/L — SIGNIFICANT CHANGE UP (ref 3.5–5.3)
PROTHROM AB SERPL-ACNC: 12.9 SEC — SIGNIFICANT CHANGE UP (ref 10.5–13.4)
RBC # BLD: 4.15 M/UL — LOW (ref 4.2–5.8)
RBC # FLD: 13 % — SIGNIFICANT CHANGE UP (ref 10.3–14.5)
SODIUM SERPL-SCNC: 131 MMOL/L — LOW (ref 135–145)
WBC # BLD: 11.62 K/UL — HIGH (ref 3.8–10.5)
WBC # FLD AUTO: 11.62 K/UL — HIGH (ref 3.8–10.5)

## 2022-10-20 PROCEDURE — 99291 CRITICAL CARE FIRST HOUR: CPT

## 2022-10-20 PROCEDURE — 71045 X-RAY EXAM CHEST 1 VIEW: CPT | Mod: 26

## 2022-10-20 RX ORDER — ALPRAZOLAM 0.25 MG
0.25 TABLET ORAL ONCE
Refills: 0 | Status: DISCONTINUED | OUTPATIENT
Start: 2022-10-20 | End: 2022-10-20

## 2022-10-20 RX ORDER — SENNA PLUS 8.6 MG/1
1 TABLET ORAL DAILY
Refills: 0 | Status: DISCONTINUED | OUTPATIENT
Start: 2022-10-20 | End: 2022-10-23

## 2022-10-20 RX ORDER — POLYETHYLENE GLYCOL 3350 17 G/17G
17 POWDER, FOR SOLUTION ORAL DAILY
Refills: 0 | Status: DISCONTINUED | OUTPATIENT
Start: 2022-10-20 | End: 2022-10-23

## 2022-10-20 RX ADMIN — CHLORHEXIDINE GLUCONATE 1 APPLICATION(S): 213 SOLUTION TOPICAL at 12:40

## 2022-10-20 RX ADMIN — Medication 1 PATCH: at 21:09

## 2022-10-20 RX ADMIN — Medication 0.25 MILLIGRAM(S): at 05:10

## 2022-10-20 RX ADMIN — Medication 6 MILLIGRAM(S): at 21:04

## 2022-10-20 RX ADMIN — Medication 75 MICROGRAM(S): at 05:09

## 2022-10-20 RX ADMIN — ATORVASTATIN CALCIUM 80 MILLIGRAM(S): 80 TABLET, FILM COATED ORAL at 21:04

## 2022-10-20 RX ADMIN — SENNA PLUS 1 TABLET(S): 8.6 TABLET ORAL at 12:40

## 2022-10-20 RX ADMIN — POLYETHYLENE GLYCOL 3350 17 GRAM(S): 17 POWDER, FOR SOLUTION ORAL at 12:40

## 2022-10-20 RX ADMIN — HEPARIN SODIUM 15 UNIT(S)/HR: 5000 INJECTION INTRAVENOUS; SUBCUTANEOUS at 08:34

## 2022-10-20 RX ADMIN — TICAGRELOR 90 MILLIGRAM(S): 90 TABLET ORAL at 05:09

## 2022-10-20 RX ADMIN — TICAGRELOR 90 MILLIGRAM(S): 90 TABLET ORAL at 17:51

## 2022-10-20 RX ADMIN — Medication 1 PATCH: at 08:13

## 2022-10-20 RX ADMIN — Medication 1 PATCH: at 19:00

## 2022-10-20 RX ADMIN — Medication 81 MILLIGRAM(S): at 12:40

## 2022-10-20 RX ADMIN — Medication 9.66 MICROGRAM(S)/KG/MIN: at 08:33

## 2022-10-20 RX ADMIN — Medication 1 PATCH: at 21:04

## 2022-10-20 NOTE — CHART NOTE - NSCHARTNOTEFT_GEN_A_CORE
Overnight events:  levo increased from 0.02mcg/kg/min --> 0.03 mcg/kg/min for. Aug diastolic pressure 80 - 82,   Pt: during the night pt again woke up SOB but not desatting or apneic on monitor. Pt requested CPAP, but took it off right away, Admitted to anxiety - 0.25mg xanax given. Stated he has been waking up at home SOB for the last 2 months. HD stable on levo 0.03mcg. Will continue to monitor.

## 2022-10-20 NOTE — PROGRESS NOTE ADULT - ASSESSMENT
63 yo M PMH current smoker presenting with acute onset mid sternal chest pain with B/L UE numbness s/p viagra use, found to hae LUKASZ in V1-6 and II with T wave inversions in III, s/p NORMAN pLAD with planned staging to RCA 95% and Cx 90% 10/17.     NEURO  -No issues  -A/O 3    RESP  -No issues  -Satting well on RA    CV  #STEMI with acute systolic heart failure  -EKG NSR with LUKASZ in V1-6 and II with T wave inversions in III  -S/p cath with significant occlusions in RCA and Cx, 99% occlusion of proximal LAD s/p PCI (DESx1) 10/16 with staged RCA (DESx2) on 10/18   -Medical mngmt/outpatient f/u for Cx lesion  -Pt still with IABP - 1:1, once levophed weaned, will wean IABP over 48 hours, c/w heparin gtt and daily CXRs  -Remains on levophed 0.02-0.03  -S/p ASA and brilinta load 10/16. C/w daily DAPT   -Trop (11 at admission) peaked at 7845 and now downtrending; CKMB, peaked at 294 and now downtrending   -Echo 10/17 with severe segmental LV systolic dysfx and hypokinesis of apex, mid-distal septum and anterior wall, unchanged echo 10/19   -Pt with AIVR which have resolved s/p Mg     #Hypotension likely in the setting of cardiogenic shock   -Started on levophed gtt 10/18 PM for augmented BPs in 90-100s.Unable to wean off 4x over past 48 hours, pt currently on 0.03 given SBP 80s on 0.02, will continue to wean as able  -Augmented pressures now in 90s-100s, will wean levophed and then balloon pump at tolerated   -Consider RHC     #CAD  -Atorvastatin 80 mg daily     GI  -No issues  -DASH diet    /Renal  -No issues    ENDO  -A1c 5.7   -TSH 16.51, free T4 0.9  -Pt with known hypothyroidism and 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

## 2022-10-20 NOTE — PROGRESS NOTE ADULT - SUBJECTIVE AND OBJECTIVE BOX
Patient is a 62y old  Male who presents with a chief complaint of STEMI (19 Oct 2022 07:47)    HPI:  This is a 61 yo M PMH HLD, hypothyroidism, current smoker presenting with acute onset mid sternal chest pain with B/L UE numbness s/p viagra use, found to have LUKASZ in V1-6 and II with T wave inversion in III. Patient's wife states 15 minutes s/p sexual intercourse, pt started having severe crushing chest pain with dizziness and diaphoresis. Pt became less alert and wife used ice water to wake him without success, so she called EMS. Pt received ASA and brilinta load in ED as well as 400 U heparin. Pt was taken to cath lab with findings: 99% occlusion LAD, 95% RCA, 90% Cx. Intraortic balloon pump placed for EF 25%, to keep RCA and CX patent until staging, and for hypotension. Pt s/p DESx1 LAD (10/16) and DESx2 to RCA (10/18). Remains on IABP.                                                                                                                                                                                  (16 Oct 2022 16:15)       INTERVAL HPI/OVERNIGHT EVENTS:   No overnight events   Afebrile, hemodynamically stable     Subjective: Pt remains on levophed with overall weakness/feeling anxious, which is not new for him. No other complaints.     ICU Vital Signs Last 24 Hrs  T(C): 36.7 (20 Oct 2022 07:32), Max: 37.1 (19 Oct 2022 16:00)  T(F): 98.1 (20 Oct 2022 07:32), Max: 98.7 (19 Oct 2022 16:00)  HR: 72 (20 Oct 2022 07:00) (61 - 84)  BP: --  BP(mean): --  ABP: 99/52 (20 Oct 2022 07:00) (85/44 - 116/66)  ABP(mean): 76 (20 Oct 2022 07:00) (63 - 91)  RR: 18 (20 Oct 2022 07:00) (12 - 24)  SpO2: 94% (20 Oct 2022 07:00) (92% - 100%)    O2 Parameters below as of 20 Oct 2022 07:00  Patient On (Oxygen Delivery Method): room air          I&O's Summary    19 Oct 2022 07:01  -  20 Oct 2022 07:00  --------------------------------------------------------  IN: 1487.5 mL / OUT: 1875 mL / NET: -387.5 mL    20 Oct 2022 07:01  -  20 Oct 2022 07:43  --------------------------------------------------------  IN: 0 mL / OUT: 400 mL / NET: -400 mL          Daily     Daily Weight in k.3 (20 Oct 2022 05:00)    EKG/Telemetry Events:    MEDICATIONS  (STANDING):  aspirin enteric coated 81 milliGRAM(s) Oral daily  atorvastatin 80 milliGRAM(s) Oral at bedtime  chlorhexidine 2% Cloths 1 Application(s) Topical daily  heparin  Infusion 1300 Unit(s)/Hr (15 mL/Hr) IV Continuous <Continuous>  levothyroxine 75 MICROGram(s) Oral daily  melatonin 6 milliGRAM(s) Oral at bedtime  nicotine -  14 mG/24Hr(s) Patch 1 Patch Transdermal daily  norepinephrine Infusion 0.05 MICROgram(s)/kG/Min (9.66 mL/Hr) IV Continuous <Continuous>  ticagrelor 90 milliGRAM(s) Oral every 12 hours    MEDICATIONS  (PRN):  acetaminophen     Tablet .. 650 milliGRAM(s) Oral every 6 hours PRN Moderate Pain (4 - 6)      PHYSICAL EXAM:  GENERAL: NAD, A/O3  HEAD:  Atraumatic, Normocephalic  EYES: EOMI, PERRLA, conjunctiva and sclera clear  NECK: Supple, No JVD, Normal thyroid, no enlarged nodes  NERVOUS SYSTEM:  Alert & Awake.   CHEST/LUNG: B/L good air entry; No rales, rhonchi, or wheezing  HEART: S1S2 normal, no S3, Regular rate and rhythm; No murmurs  ABDOMEN: Soft, Nontender, Nondistended; Bowel sounds present  EXTREMITIES:  2+ Peripheral Pulses, No clubbing, cyanosis, or edema  LYMPH: No lymphadenopathy noted  SKIN: No rashes or lesions    LABS:                        13.0   11.62 )-----------( 150      ( 20 Oct 2022 02:40 )             38.2     10-20    131<L>  |  98  |  10  ----------------------------<  133<H>  3.9   |  21<L>  |  0.73    Ca    9.6      20 Oct 2022 02:40  Phos  3.6     10-20  Mg     1.80     10-20        PT/INR - ( 20 Oct 2022 02:40 )   PT: 12.9 sec;   INR: 1.11 ratio         PTT - ( 20 Oct 2022 02:40 )  PTT:64.0 sec  CAPILLARY BLOOD GLUCOSE        ABG - ( 20 Oct 2022 02:40 )  pH, Arterial: 7.50  pH, Blood: x     /  pCO2: 28    /  pO2: 94    / HCO3: 22    / Base Excess: -0.3  /  SaO2: 98.8              Creatine Kinase, Serum: 127 U/L (10-20 @ 02:40)    CARDIAC MARKERS ( 20 Oct 2022 02:40 )  x     / x     / 127 U/L / x     / x      CARDIAC MARKERS ( 19 Oct 2022 00:24 )  x     / x     / 265 U/L / x     / x                RADIOLOGY & ADDITIONAL TESTS:  CXR:        Care Discussed with Consultants/Other Providers [ x] YES  [ ] NO

## 2022-10-21 LAB
ANION GAP SERPL CALC-SCNC: 12 MMOL/L — SIGNIFICANT CHANGE UP (ref 7–14)
APTT BLD: 74.7 SEC — HIGH (ref 27–36.3)
BUN SERPL-MCNC: 12 MG/DL — SIGNIFICANT CHANGE UP (ref 7–23)
CALCIUM SERPL-MCNC: 9.8 MG/DL — SIGNIFICANT CHANGE UP (ref 8.4–10.5)
CHLORIDE SERPL-SCNC: 99 MMOL/L — SIGNIFICANT CHANGE UP (ref 98–107)
CK SERPL-CCNC: 88 U/L — SIGNIFICANT CHANGE UP (ref 30–200)
CO2 SERPL-SCNC: 23 MMOL/L — SIGNIFICANT CHANGE UP (ref 22–31)
CREAT SERPL-MCNC: 0.71 MG/DL — SIGNIFICANT CHANGE UP (ref 0.5–1.3)
EGFR: 105 ML/MIN/1.73M2 — SIGNIFICANT CHANGE UP
GLUCOSE SERPL-MCNC: 121 MG/DL — HIGH (ref 70–99)
HCT VFR BLD CALC: 39.4 % — SIGNIFICANT CHANGE UP (ref 39–50)
HGB BLD-MCNC: 13.2 G/DL — SIGNIFICANT CHANGE UP (ref 13–17)
INR BLD: 1.16 RATIO — SIGNIFICANT CHANGE UP (ref 0.88–1.16)
MAGNESIUM SERPL-MCNC: 1.9 MG/DL — SIGNIFICANT CHANGE UP (ref 1.6–2.6)
MCHC RBC-ENTMCNC: 30.9 PG — SIGNIFICANT CHANGE UP (ref 27–34)
MCHC RBC-ENTMCNC: 33.5 GM/DL — SIGNIFICANT CHANGE UP (ref 32–36)
MCV RBC AUTO: 92.3 FL — SIGNIFICANT CHANGE UP (ref 80–100)
NRBC # BLD: 0 /100 WBCS — SIGNIFICANT CHANGE UP (ref 0–0)
NRBC # FLD: 0 K/UL — SIGNIFICANT CHANGE UP (ref 0–0)
PHOSPHATE SERPL-MCNC: 4.2 MG/DL — SIGNIFICANT CHANGE UP (ref 2.5–4.5)
PLATELET # BLD AUTO: 168 K/UL — SIGNIFICANT CHANGE UP (ref 150–400)
POTASSIUM SERPL-MCNC: 4.2 MMOL/L — SIGNIFICANT CHANGE UP (ref 3.5–5.3)
POTASSIUM SERPL-SCNC: 4.2 MMOL/L — SIGNIFICANT CHANGE UP (ref 3.5–5.3)
PROTHROM AB SERPL-ACNC: 13.5 SEC — HIGH (ref 10.5–13.4)
RBC # BLD: 4.27 M/UL — SIGNIFICANT CHANGE UP (ref 4.2–5.8)
RBC # FLD: 13 % — SIGNIFICANT CHANGE UP (ref 10.3–14.5)
SODIUM SERPL-SCNC: 134 MMOL/L — LOW (ref 135–145)
WBC # BLD: 9.61 K/UL — SIGNIFICANT CHANGE UP (ref 3.8–10.5)
WBC # FLD AUTO: 9.61 K/UL — SIGNIFICANT CHANGE UP (ref 3.8–10.5)

## 2022-10-21 PROCEDURE — 71045 X-RAY EXAM CHEST 1 VIEW: CPT | Mod: 26

## 2022-10-21 PROCEDURE — 99291 CRITICAL CARE FIRST HOUR: CPT

## 2022-10-21 RX ORDER — LOSARTAN POTASSIUM 100 MG/1
12.5 TABLET, FILM COATED ORAL DAILY
Refills: 0 | Status: DISCONTINUED | OUTPATIENT
Start: 2022-10-21 | End: 2022-10-23

## 2022-10-21 RX ORDER — LACTULOSE 10 G/15ML
10 SOLUTION ORAL ONCE
Refills: 0 | Status: COMPLETED | OUTPATIENT
Start: 2022-10-21 | End: 2022-10-21

## 2022-10-21 RX ADMIN — ATORVASTATIN CALCIUM 80 MILLIGRAM(S): 80 TABLET, FILM COATED ORAL at 21:20

## 2022-10-21 RX ADMIN — POLYETHYLENE GLYCOL 3350 17 GRAM(S): 17 POWDER, FOR SOLUTION ORAL at 11:39

## 2022-10-21 RX ADMIN — Medication 75 MICROGRAM(S): at 06:02

## 2022-10-21 RX ADMIN — Medication 81 MILLIGRAM(S): at 11:39

## 2022-10-21 RX ADMIN — LACTULOSE 10 GRAM(S): 10 SOLUTION ORAL at 19:55

## 2022-10-21 RX ADMIN — TICAGRELOR 90 MILLIGRAM(S): 90 TABLET ORAL at 06:02

## 2022-10-21 RX ADMIN — TICAGRELOR 90 MILLIGRAM(S): 90 TABLET ORAL at 17:30

## 2022-10-21 RX ADMIN — Medication 6 MILLIGRAM(S): at 21:20

## 2022-10-21 RX ADMIN — Medication 1 PATCH: at 19:55

## 2022-10-21 RX ADMIN — SENNA PLUS 1 TABLET(S): 8.6 TABLET ORAL at 11:40

## 2022-10-21 RX ADMIN — Medication 1 PATCH: at 19:52

## 2022-10-21 RX ADMIN — Medication 1 PATCH: at 20:02

## 2022-10-21 RX ADMIN — Medication 1 PATCH: at 07:21

## 2022-10-21 NOTE — PROGRESS NOTE ADULT - ASSESSMENT
61 yo M PMH current smoker presenting with acute onset mid sternal chest pain with B/L UE numbness s/p viagra use, found to hae LUKASZ in V1-6 and II with T wave inversions in III, s/p NORMAN pLAD with planned staging to RCA 95% and Cx 90% 10/17.     NEURO  -No issues  -A/O 3    RESP  -No issues  -Satting well on RA    CV  #STEMI with acute systolic heart failure  -EKG NSR with LUKASZ in V1-6 and II with T wave inversions in III  -S/p cath with significant occlusions in RCA and Cx, 99% occlusion of proximal LAD s/p PCI (DESx1) 10/16 with staged RCA (DESx2) on 10/18   -Medical mngmt/outpatient f/u for Cx lesion  -Pt still with IABP - 10/19 weaned levophed off then weaned IABP 1:1 > 1:2, 1:3. Pt placed back on 1:1 in early morning and heparin gtt was held, plan to remove IABP this AM  -S/p ASA and brilinta load 10/16. C/w daily DAPT   -Trop (11 at admission) peaked at 7845 and now downtrending; CKMB, peaked at 294 and now downtrending   -Echo 10/17 with severe segmental LV systolic dysfx and hypokinesis of apex, mid-distal septum and anterior wall, unchanged echo 10/19   -Pt with AIVR which have resolved s/p Mg     #Hypotension likely in the setting of cardiogenic shock   -Started on levophed gtt 10/18 PM for augmented BPs in 90-100s.Weaned off 10/19  -Augmented pressures stable off levophed and with IABP 1:1, 1:2, 1:3, will plan to remove this AM    #CAD  -Atorvastatin 80 mg daily     GI  -No issues  -DASH diet    /Renal  -No issues    ENDO  -A1c 5.7   -TSH 16.51, free T4 0.9  -Pt with known hypothyroidism and 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

## 2022-10-22 ENCOUNTER — TRANSCRIPTION ENCOUNTER (OUTPATIENT)
Age: 62
End: 2022-10-22

## 2022-10-22 LAB
ANION GAP SERPL CALC-SCNC: 13 MMOL/L — SIGNIFICANT CHANGE UP (ref 7–14)
BUN SERPL-MCNC: 16 MG/DL — SIGNIFICANT CHANGE UP (ref 7–23)
CALCIUM SERPL-MCNC: 9.8 MG/DL — SIGNIFICANT CHANGE UP (ref 8.4–10.5)
CHLORIDE SERPL-SCNC: 100 MMOL/L — SIGNIFICANT CHANGE UP (ref 98–107)
CK SERPL-CCNC: 98 U/L — SIGNIFICANT CHANGE UP (ref 30–200)
CO2 SERPL-SCNC: 22 MMOL/L — SIGNIFICANT CHANGE UP (ref 22–31)
CREAT SERPL-MCNC: 0.82 MG/DL — SIGNIFICANT CHANGE UP (ref 0.5–1.3)
EGFR: 101 ML/MIN/1.73M2 — SIGNIFICANT CHANGE UP
GLUCOSE SERPL-MCNC: 116 MG/DL — HIGH (ref 70–99)
HCT VFR BLD CALC: 41.9 % — SIGNIFICANT CHANGE UP (ref 39–50)
HGB BLD-MCNC: 14 G/DL — SIGNIFICANT CHANGE UP (ref 13–17)
MAGNESIUM SERPL-MCNC: 2 MG/DL — SIGNIFICANT CHANGE UP (ref 1.6–2.6)
MCHC RBC-ENTMCNC: 31.2 PG — SIGNIFICANT CHANGE UP (ref 27–34)
MCHC RBC-ENTMCNC: 33.4 GM/DL — SIGNIFICANT CHANGE UP (ref 32–36)
MCV RBC AUTO: 93.3 FL — SIGNIFICANT CHANGE UP (ref 80–100)
NRBC # BLD: 0 /100 WBCS — SIGNIFICANT CHANGE UP (ref 0–0)
NRBC # FLD: 0 K/UL — SIGNIFICANT CHANGE UP (ref 0–0)
PHOSPHATE SERPL-MCNC: 3.9 MG/DL — SIGNIFICANT CHANGE UP (ref 2.5–4.5)
PLATELET # BLD AUTO: 174 K/UL — SIGNIFICANT CHANGE UP (ref 150–400)
POTASSIUM SERPL-MCNC: 4.7 MMOL/L — SIGNIFICANT CHANGE UP (ref 3.5–5.3)
POTASSIUM SERPL-SCNC: 4.7 MMOL/L — SIGNIFICANT CHANGE UP (ref 3.5–5.3)
RBC # BLD: 4.49 M/UL — SIGNIFICANT CHANGE UP (ref 4.2–5.8)
RBC # FLD: 12.7 % — SIGNIFICANT CHANGE UP (ref 10.3–14.5)
SODIUM SERPL-SCNC: 135 MMOL/L — SIGNIFICANT CHANGE UP (ref 135–145)
WBC # BLD: 10.09 K/UL — SIGNIFICANT CHANGE UP (ref 3.8–10.5)
WBC # FLD AUTO: 10.09 K/UL — SIGNIFICANT CHANGE UP (ref 3.8–10.5)

## 2022-10-22 PROCEDURE — 99233 SBSQ HOSP IP/OBS HIGH 50: CPT

## 2022-10-22 PROCEDURE — 36569 INSJ PICC 5 YR+ W/O IMAGING: CPT

## 2022-10-22 RX ORDER — TICAGRELOR 90 MG/1
1 TABLET ORAL
Qty: 30 | Refills: 0
Start: 2022-10-22

## 2022-10-22 RX ORDER — FUROSEMIDE 40 MG
20 TABLET ORAL DAILY
Refills: 0 | Status: DISCONTINUED | OUTPATIENT
Start: 2022-10-22 | End: 2022-10-23

## 2022-10-22 RX ORDER — CALAMINE AND ZINC OXIDE AND PHENOL 160; 10 MG/ML; MG/ML
1 LOTION TOPICAL
Refills: 0 | Status: DISCONTINUED | OUTPATIENT
Start: 2022-10-22 | End: 2022-10-23

## 2022-10-22 RX ADMIN — TICAGRELOR 90 MILLIGRAM(S): 90 TABLET ORAL at 17:39

## 2022-10-22 RX ADMIN — ATORVASTATIN CALCIUM 80 MILLIGRAM(S): 80 TABLET, FILM COATED ORAL at 21:16

## 2022-10-22 RX ADMIN — TICAGRELOR 90 MILLIGRAM(S): 90 TABLET ORAL at 05:28

## 2022-10-22 RX ADMIN — Medication 75 MICROGRAM(S): at 05:28

## 2022-10-22 RX ADMIN — Medication 1 PATCH: at 21:11

## 2022-10-22 RX ADMIN — CALAMINE AND ZINC OXIDE AND PHENOL 1 APPLICATION(S): 160; 10 LOTION TOPICAL at 17:39

## 2022-10-22 RX ADMIN — CHLORHEXIDINE GLUCONATE 1 APPLICATION(S): 213 SOLUTION TOPICAL at 12:17

## 2022-10-22 RX ADMIN — Medication 81 MILLIGRAM(S): at 11:09

## 2022-10-22 RX ADMIN — Medication 6 MILLIGRAM(S): at 21:16

## 2022-10-22 RX ADMIN — LOSARTAN POTASSIUM 12.5 MILLIGRAM(S): 100 TABLET, FILM COATED ORAL at 05:27

## 2022-10-22 RX ADMIN — Medication 1 PATCH: at 18:57

## 2022-10-22 RX ADMIN — Medication 1 PATCH: at 07:10

## 2022-10-22 RX ADMIN — Medication 20 MILLIGRAM(S): at 11:08

## 2022-10-22 RX ADMIN — POLYETHYLENE GLYCOL 3350 17 GRAM(S): 17 POWDER, FOR SOLUTION ORAL at 12:17

## 2022-10-22 RX ADMIN — SENNA PLUS 1 TABLET(S): 8.6 TABLET ORAL at 11:09

## 2022-10-22 RX ADMIN — Medication 1 PATCH: at 21:16

## 2022-10-22 NOTE — DISCHARGE NOTE PROVIDER - NSDCCPCAREPLAN_GEN_ALL_CORE_FT
PRINCIPAL DISCHARGE DIAGNOSIS  Diagnosis: Acute myocardial infarction  Assessment and Plan of Treatment: Your EKG and cardiac angiogram showed you had heart attack with block arteries/ you got stent in you left and right heart arteries. You heart pumping power is weak. You continue to take medciation prescibed to you and follow with cardiology. It is very importent to stop smoking       PRINCIPAL DISCHARGE DIAGNOSIS  Diagnosis: Acute myocardial infarction  Assessment and Plan of Treatment: Your EKG and cardiac angiogram showed you had heart attack with block arteries/ you got stent in you left and right heart arteries. You heart pumping power is weak. You continue to take medciation prescribed to you and follow with cardiology- the aspirin and plavix keep the stent patent, so must be taken daily otherwise occlusion of the stent may occur. It is very importent to stop smoking. If you experience any chest pain, shortness of breath, jaw pain, or other concerning symptoms, please come back to the ED.

## 2022-10-22 NOTE — DISCHARGE NOTE PROVIDER - NSDCCPTREATMENT_GEN_ALL_CORE_FT
PRINCIPAL PROCEDURE  Procedure: 2D echo  Findings and Treatment: CONCLUSIONS:  1. Mitral annular calcification, otherwise normal mitral  valve. Minimal mitral regurgitation.  2. Normal left ventricular internal dimensions and wall  thicknesses.  3. Severe segmental left ventricular systolic dysfunction,  with hypokinesis of the apex, mid to distal septum, and mid  to distal anterior wall. No obvious LV thrombus noted.  4. Mild diastolic dysfunction (Stage I).  5. The right ventricle is not well visualized; grossly  normal right ventricular systolic function.  6. Normal tricuspid valve. Minimal tricuspid regurgitation.  *** Compared with echocardiogram of 10/17/2022, no  significant changes noted.

## 2022-10-22 NOTE — PROGRESS NOTE ADULT - SUBJECTIVE AND OBJECTIVE BOX
Subjective/Objective: patient denies chest pain , sob, slept well last night . intra aortic ballon pump d/c on 10/21 . Rt groin intact        Tele event:NSR    MEDICATIONS    aspirin enteric coated 81 milliGRAM(s) Oral daily  losartan 12.5 milliGRAM(s) Oral daily  ticagrelor 90 milliGRAM(s) Oral every 12 hours  acetaminophen     Tablet .. 650 milliGRAM(s) Oral every 6 hours PRN  melatonin 6 milliGRAM(s) Oral at bedtime  polyethylene glycol 3350 17 Gram(s) Oral daily  senna 1 Tablet(s) Oral daily  atorvastatin 80 milliGRAM(s) Oral at bedtime  levothyroxine 75 MICROGram(s) Oral daily  chlorhexidine 2% Cloths 1 Application(s) Topical daily            REVIEW OF SYSTEMS    General: no fatigue/malaise, weight loss/gain.  Skin: no rashes.  Ophthalmologic: no blurred vision, no loss of vision. 	  ENT: no sore throat, rhinorrhea, sinus congestion.  Cardiovascular:no chest pain ,no palpitation,no dizziness,no diaphoresis,no edema  Respiratory: no SOB, cough or wheeze.  Gastrointestinal:  no N/V/D, no melena/hematemesis/hematochezia.  Genitourinary: no dysuria/hesitancy or hematuria.  Musculoskeletal: no myalgias or arthralgias.  Neurological: no changes in vision or hearing, no lightheadedness/dizziness, no syncope/near syncope	  Psychiatric: no unusual stress/anxiety      	    ICU Vital Signs Last 24 Hrs  T(C): 36.8 (22 Oct 2022 04:00), Max: 37.4 (21 Oct 2022 16:10)  T(F): 98.2 (22 Oct 2022 04:00), Max: 99.4 (21 Oct 2022 16:10)  HR: 87 (22 Oct 2022 06:00) (72 - 101)  BP: 96/64 (22 Oct 2022 06:00) (88/59 - 118/68)  BP(mean): 75 (22 Oct 2022 06:00) (69 - 98)  ABP: 94/51 (21 Oct 2022 11:00) (79/50 - 96/51)  ABP(mean): 71 (21 Oct 2022 11:00) (64 - 73)  RR: 19 (22 Oct 2022 06:00) (13 - 25)  SpO2: 95% (22 Oct 2022 06:00) (91% - 99%)    O2 Parameters below as of 22 Oct 2022 06:00  Patient On (Oxygen Delivery Method): room air            PHYSICAL EXAMINATION  Appearance: NAD, no distress  HEENT: Moist Mucous Membranes, Anicteric, PERRL, EOMI  Cardiovascular: Regular rate and rhythm, Normal S1 S2, No JVD, No murmurs  Respiratory: Lungs clear to auscultation. No rales, No rhonchi, No wheezing. No tenderness to palpation  Gastrointestinal:  Soft, Non-tender, + BS  Neurologic: Non-focal, A&Ox3  Skin: Warm and dry, No rashes, No ecchymosis, No cyanosis, Rt groin intact  Musculoskeletal: No clubbing, No cyanosis, No edema  Vascular: Peripheral pulses palpable 2+ bilaterally  Psychiatry: Mood & affect appropriate      	    		      I&O's Summary    21 Oct 2022 07:01  -  22 Oct 2022 07:00  --------------------------------------------------------  IN: 0 mL / OUT: 2175 mL / NET: -2175 mL    	 	  LABORATORY VALUES	 	                          14.0   10.09 )-----------( 174      ( 22 Oct 2022 05:40 )             41.9       10-22    135  |  100  |  16  ----------------------------<  116<H>  4.7   |  22  |  0.82  10-21    134<L>  |  99  |  12  ----------------------------<  121<H>  4.2   |  23  |  0.71    Ca    9.8      22 Oct 2022 05:40  Ca    9.8      21 Oct 2022 04:09  Phos  3.9     10-22  Phos  4.2     10-21  Mg     2.00     10-22  Mg     1.90     10-21            CARDIAC MARKERS:  Creatine Kinase, Serum: 98 U/L (10-22 @ 05:40)  Creatine Kinase, Serum: 88 U/L (10-21 @ 04:09)  Creatine Kinase, Serum: 127 U/L (10-20 @ 02:40)    Serum Pro-Brain Natriuretic Peptide: 45 pg/mL (10-16 @ 15:51)      10-19 @ 00:24  Cholesterol, Serum - 166  Direct LDL- --  HDL Cholesterol, Serum- 30  Triglycerides, Serum- 198      Thyroid Stimulating Hormone, Serum: 16.51 uIU/mL (10-19 @ 00:24)        CAPILLARY BLOOD GLUCOSE          10-16 @ 17:00  229E Corona Virus --  Adenovirus NotDetec  Bordetella pertussis NotDete  Chlamydia pneumoniae NotDete  Entero/Rhino Virus NotDete  HKU1 Coronavirus --  hMPV [facility]  Influenza A [facility]  Influenza AH1 --  Influenza AH1 2009 --  Influenza AH3 --  Influenza B [facility]  Mycoplasma pneumoniae [facility]  NL63 Coronavirus --  OC43 Corornavirus --  Parainfluenza 1 [facility]  Parainfluenza 2 [facility]        Diagnostic testing:  cath:  echo:< from: Transthoracic Echocardiogram (10.19.22 @ 14:46) >  ------------------------------------------------------------------------  DIMENSIONS:  Dimensions:     Normal Values:  LA:     3.4 cm    2.0 - 4.0 cm  Ao:     3.7 cm    2.0 - 3.8 cm  SEPTUM: 0.9 cm    0.6 - 1.2 cm  PWT:    1.0 cm    0.6 - 1.1 cm  LVIDd:  5.4 cm    3.0 - 5.6 cm  LVIDs:  3.4 cm    1.8 - 4.0 cm  Derived Variables:  LVMI: 87 g/m2  RWT: 0.37  Fractional short: 37 %  Ejection Fraction (Visual Estimate): 20-25 %  ------------------------------------------------------------------------  OBSERVATIONS:  Mitral Valve: Mitral annular calcification, otherwise  normal mitral valve. Minimal mitral regurgitation.  Aortic Root: Normal aortic root.  Aortic Valve: Normal trileaflet aortic valve. No aortic  valveregurgitation seen.  Left Atrium: Normal left atrium.  LA volume index = 20  cc/m2.  Left Ventricle: Severe segmental left ventricular systolic  dysfunction, with hypokinesis of the apex, mid to distal  septum, and mid to distal anterior wall. No obvious LV  thrombus noted. Normal left ventricular internal dimensions  and wall thicknesses. Mild diastolic dysfunction (Stage I).  Right Heart: Normal right atrium. The right ventricle is  not well visualized; grossly normal right ventricular  systolic function. Normal tricuspid valve. Minimal  tricuspid regurgitation. Pulmonic valve not well  visualized.  Pericardium/PleuraNormal pericardium with no pericardial  effusion.  Hemodynamic: Inadequate tricuspid regurgitation Doppler  envelope precludes estimation of RVSP.    < end of copied text >        Assessment and Plan:63 yo M PMH current smoker presenting with acute onset mid sternal chest pain with B/L UE numbness s/p viagra use, found to hae LUKASZ in V1-6 and II with T wave inversions in III, s/p NORMAN pLAD with planned staging to RCA 95% and Cx 90% 10/17.     NEURO  -No issues  -A/O 3    RESP  -No issues  -Satting well on RA    CV  #STEMI with acute systolic heart failure  -EKG NSR with LUKASZ in V1-6 and II with T wave inversions in III  -S/p cath with significant occlusions in RCA and Cx, 99% occlusion of proximal LAD s/p PCI (DESx1) 10/16 with staged RCA (DESx2) on 10/18   -Medical mngmt/outpatient f/u for Cx lesion  -Pt still with IABP - 1:1, once levophed weaned, will wean IABP over 48 hours, c/w heparin gtt and daily CXRs  -Remains on levophed 0.02-0.03  -S/p ASA and brilinta load 10/16. C/w daily DAPT   -Trop (11 at admission) peaked at 7845 and now downtrending; CKMB, peaked at 294 and now downtrending   -Echo 10/17 with severe segmental LV systolic dysfx and hypokinesis of apex, mid-distal septum and anterior wall, unchanged echo 10/19   -Pt with AIVR which have resolved s/p Mg     #Hypotension likely in the setting of cardiogenic shock   -Started on levophed gtt 10/18 PM for augmented BPs in 90-100s.Unable to wean off 4x over past 48 hours, pt currently on 0.03 given SBP 80s on 0.02, will continue to wean as able  -Augmented pressures now in 90s-100s, will wean levophed and then balloon pump at tolerated   -Consider RHC     #CAD  -Atorvastatin 80 mg daily     GI  -No issues  -DASH diet    /Renal  -No issues    ENDO  -A1c 5.7   -TSH 16.51, free T4 0.9  -Pt with known hypothyroidism and 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         Subjective/Objective: patient denies chest pain , sob, unable to sleep well last night . intra aortic ballon pump d/c on 10/21 . Rt groin ecchymosis but  intact        Tele event:NSR    MEDICATIONS    aspirin enteric coated 81 milliGRAM(s) Oral daily  losartan 12.5 milliGRAM(s) Oral daily  ticagrelor 90 milliGRAM(s) Oral every 12 hours  acetaminophen     Tablet .. 650 milliGRAM(s) Oral every 6 hours PRN  melatonin 6 milliGRAM(s) Oral at bedtime  polyethylene glycol 3350 17 Gram(s) Oral daily  senna 1 Tablet(s) Oral daily  atorvastatin 80 milliGRAM(s) Oral at bedtime  levothyroxine 75 MICROGram(s) Oral daily  chlorhexidine 2% Cloths 1 Application(s) Topical daily            REVIEW OF SYSTEMS    General: no fatigue/malaise, weight loss/gain.  Skin: no rashes.  Ophthalmologic: no blurred vision, no loss of vision. 	  ENT: no sore throat, rhinorrhea, sinus congestion.  Cardiovascular:no chest pain ,no palpitation,no dizziness,no diaphoresis,no edema  Respiratory: no SOB, cough or wheeze.  Gastrointestinal:  no N/V/D, no melena/hematemesis/hematochezia.  Genitourinary: no dysuria/hesitancy or hematuria.  Musculoskeletal: no myalgias or arthralgias.  Neurological: no changes in vision or hearing, no lightheadedness/dizziness, no syncope/near syncope	  Psychiatric: no unusual stress/anxiety      	    ICU Vital Signs Last 24 Hrs  T(C): 36.8 (22 Oct 2022 04:00), Max: 37.4 (21 Oct 2022 16:10)  T(F): 98.2 (22 Oct 2022 04:00), Max: 99.4 (21 Oct 2022 16:10)  HR: 87 (22 Oct 2022 06:00) (72 - 101)  BP: 96/64 (22 Oct 2022 06:00) (88/59 - 118/68)  BP(mean): 75 (22 Oct 2022 06:00) (69 - 98)  ABP: 94/51 (21 Oct 2022 11:00) (79/50 - 96/51)  ABP(mean): 71 (21 Oct 2022 11:00) (64 - 73)  RR: 19 (22 Oct 2022 06:00) (13 - 25)  SpO2: 95% (22 Oct 2022 06:00) (91% - 99%)    O2 Parameters below as of 22 Oct 2022 06:00  Patient On (Oxygen Delivery Method): room air            PHYSICAL EXAMINATION  Appearance: NAD, no distress  HEENT: Moist Mucous Membranes, Anicteric, PERRL, EOMI  Cardiovascular: Regular rate and rhythm, Normal S1 S2, No JVD, No murmurs  Respiratory: Lungs clear to auscultation. No rales, No rhonchi, No wheezing.   Gastrointestinal:  Soft, Non-tender, + BS  Neurologic: Non-focal, A&Ox3  Skin: Warm and dry, No rashes, No ecchymosis, No cyanosis, Rt groin intact  Musculoskeletal: No clubbing, No cyanosis, No edema  Vascular: Peripheral pulses palpable 2+ bilaterally  Psychiatry: Mood & affect appropriate      	    		      I&O's Summary    21 Oct 2022 07:01  -  22 Oct 2022 07:00  --------------------------------------------------------  IN: 0 mL / OUT: 2175 mL / NET: -2175 mL    	 	  LABORATORY VALUES	 	                          14.0   10.09 )-----------( 174      ( 22 Oct 2022 05:40 )             41.9       10-22    135  |  100  |  16  ----------------------------<  116<H>  4.7   |  22  |  0.82  10-21    134<L>  |  99  |  12  ----------------------------<  121<H>  4.2   |  23  |  0.71    Ca    9.8      22 Oct 2022 05:40  Ca    9.8      21 Oct 2022 04:09  Phos  3.9     10-22  Phos  4.2     10-21  Mg     2.00     10-22  Mg     1.90     10-21            CARDIAC MARKERS:  Creatine Kinase, Serum: 98 U/L (10-22 @ 05:40)  Creatine Kinase, Serum: 88 U/L (10-21 @ 04:09)  Creatine Kinase, Serum: 127 U/L (10-20 @ 02:40)    Serum Pro-Brain Natriuretic Peptide: 45 pg/mL (10-16 @ 15:51)      10-19 @ 00:24  Cholesterol, Serum - 166  Direct LDL- --  HDL Cholesterol, Serum- 30  Triglycerides, Serum- 198      Thyroid Stimulating Hormone, Serum: 16.51 uIU/mL (10-19 @ 00:24)        CAPILLARY BLOOD GLUCOSE          10-16 @ 17:00  229E Corona Virus --  Adenovirus NotDetec  Bordetella pertussis NotDete  Chlamydia pneumoniae NotDete  Entero/Rhino Virus [facility]  HKU1 Coronavirus --  hMPV [facility]  Influenza A [facility]  Influenza AH1 --  Influenza AH1 2009 --  Influenza AH3 --  Influenza B [facility]  Mycoplasma pneumoniae [facility]  NL63 Coronavirus --  OC43 Corornavirus --  Parainfluenza 1 [facility]  Parainfluenza 2 [facility]        Diagnostic testing:  cath:  echo:< from: Transthoracic Echocardiogram (10.19.22 @ 14:46) >  ------------------------------------------------------------------------  DIMENSIONS:  Dimensions:     Normal Values:  LA:     3.4 cm    2.0 - 4.0 cm  Ao:     3.7 cm    2.0 - 3.8 cm  SEPTUM: 0.9 cm    0.6 - 1.2 cm  PWT:    1.0 cm    0.6 - 1.1 cm  LVIDd:  5.4 cm    3.0 - 5.6 cm  LVIDs:  3.4 cm    1.8 - 4.0 cm  Derived Variables:  LVMI: 87 g/m2  RWT: 0.37  Fractional short: 37 %  Ejection Fraction (Visual Estimate): 20-25 %  ------------------------------------------------------------------------  OBSERVATIONS:  Mitral Valve: Mitral annular calcification, otherwisenormal mitral valve. Minimal mitral regurgitation.  Aortic Root: Normal aortic root.  Aortic Valve: Normal trileaflet aortic valve. No aorticvalveregurgitation seen.  Left Atrium: Normal left atrium.  LA volume index = 20cc/m2.  Left Ventricle: Severe segmental left ventricular systolicdysfunction, with hypokinesis of the apex, mid to distalseptum, and mid to distal anterior wall. No obvious LVthrombus noted. Normal left ventricular internal dimensionsand wall thicknesses. Mild diastolic dysfunction (Stage I).Right Heart: Normal right atrium. The right ventricle is  not well visualized; grossly normal right ventricularsystolic function. Normal tricuspid valve. Minimaltricuspid regurgitation. Pulmonic valve not wellvisualized.  Pericardium/PleuraNormal pericardium with no pericardialeffusion.  Hemodynamic: Inadequate tricuspid regurgitation Dopplerenvelope precludes estimation of RVSP.    < end of copied text >        Assessment and Plan:61 yo M PMH current smoker presenting with acute onset mid sternal chest pain with B/L UE numbness s/p viagra use, found to hae LUKASZ in V1-6 and II with T wave inversions in III, s/p NORMAN pLAD with planned staging to RCA 95% and Cx 90% 10/17.     NEURO  -No issues  -A/O 3    RESP  -No issues  -Satting well on RA    CV  #STEMI with acute systolic heart failure  -EKG NSR with LUKASZ in V1-6 and II with T wave inversions in III  -S/p cath with significant occlusions in RCA and Cx, 99% occlusion of proximal LAD s/p PCI (DESx1) 10/16 with staged RCA (DESx2) on 10/18   -Medical mngmt/outpatient f/u for Cx lesion  -Pt still with IABP - 1:1, once levophed weaned, will wean IABP over 48 hours, c/w heparin gtt and daily CXRs  -Remains on levophed 0.02-0.03  -S/p ASA and brilinta load 10/16. C/w daily DAPT   -Trop (11 at admission) peaked at 7845 and now downtrending; CKMB, peaked at 294 and now downtrending   -Echo 10/17 with severe segmental LV systolic dysfx and hypokinesis of apex, mid-distal septum and anterior wall, unchanged echo 10/19   -Pt with AIVR which have resolved s/p Mg     #Hypotension likely in the setting of cardiogenic shock   -Started on levophed gtt 10/18 PM for augmented BPs in 90-100s.Unable to wean off 4x over past 48 hours, pt currently on 0.03 given SBP 80s on 0.02, will continue to wean as able  -Augmented pressures now in 90s-100s, will wean levophed and then balloon pump at tolerated   -Consider RHC     #CAD  -Atorvastatin 80 mg daily     GI  -No issues  -DASH diet    /Renal  -No issues    ENDO  -A1c 5.7   -TSH 16.51, free T4 0.9  -Pt with known hypothyroidism and 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         Subjective/Objective: patient denies chest pain , sob, unable to sleep well last night . intra aortic ballon pump d/c on 10/21 . Rt groin ecchymosis but  intact        Tele event:NSR    MEDICATIONS    aspirin enteric coated 81 milliGRAM(s) Oral daily  losartan 12.5 milliGRAM(s) Oral daily  ticagrelor 90 milliGRAM(s) Oral every 12 hours  acetaminophen     Tablet .. 650 milliGRAM(s) Oral every 6 hours PRN  melatonin 6 milliGRAM(s) Oral at bedtime  polyethylene glycol 3350 17 Gram(s) Oral daily  senna 1 Tablet(s) Oral daily  atorvastatin 80 milliGRAM(s) Oral at bedtime  levothyroxine 75 MICROGram(s) Oral daily  chlorhexidine 2% Cloths 1 Application(s) Topical daily            REVIEW OF SYSTEMS    General: no fatigue/malaise, weight loss/gain.  Skin: no rashes.  Ophthalmologic: no blurred vision, no loss of vision. 	  ENT: no sore throat, rhinorrhea, sinus congestion.  Cardiovascular:no chest pain ,no palpitation,no dizziness,no diaphoresis,no edema  Respiratory: no SOB, cough or wheeze.  Gastrointestinal:  no N/V/D, no melena/hematemesis/hematochezia.  Genitourinary: no dysuria/hesitancy or hematuria.  Musculoskeletal: no myalgias or arthralgias.  Neurological: no changes in vision or hearing, no lightheadedness/dizziness, no syncope/near syncope	  Psychiatric: no unusual stress/anxiety      	    ICU Vital Signs Last 24 Hrs  T(C): 36.8 (22 Oct 2022 04:00), Max: 37.4 (21 Oct 2022 16:10)  T(F): 98.2 (22 Oct 2022 04:00), Max: 99.4 (21 Oct 2022 16:10)  HR: 87 (22 Oct 2022 06:00) (72 - 101)  BP: 96/64 (22 Oct 2022 06:00) (88/59 - 118/68)  BP(mean): 75 (22 Oct 2022 06:00) (69 - 98)  ABP: 94/51 (21 Oct 2022 11:00) (79/50 - 96/51)  ABP(mean): 71 (21 Oct 2022 11:00) (64 - 73)  RR: 19 (22 Oct 2022 06:00) (13 - 25)  SpO2: 95% (22 Oct 2022 06:00) (91% - 99%)    O2 Parameters below as of 22 Oct 2022 06:00  Patient On (Oxygen Delivery Method): room air            PHYSICAL EXAMINATION  Appearance: NAD, no distress  HEENT: Moist Mucous Membranes, Anicteric, PERRL, EOMI  Cardiovascular: Regular rate and rhythm, Normal S1 S2, No JVD, No murmurs  Respiratory: Lungs clear to auscultation. No rales, No rhonchi, No wheezing.   Gastrointestinal:  Soft, Non-tender, + BS  Neurologic: Non-focal, A&Ox3  Skin: Warm and dry, No rashes, No ecchymosis, No cyanosis, Rt groin intact  Musculoskeletal: No clubbing, No cyanosis, No edema  Vascular: Peripheral pulses palpable 2+ bilaterally  Psychiatry: Mood & affect appropriate      	    		      I&O's Summary    21 Oct 2022 07:01  -  22 Oct 2022 07:00  --------------------------------------------------------  IN: 0 mL / OUT: 2175 mL / NET: -2175 mL    	 	  LABORATORY VALUES	 	                          14.0   10.09 )-----------( 174      ( 22 Oct 2022 05:40 )             41.9       10-22    135  |  100  |  16  ----------------------------<  116<H>  4.7   |  22  |  0.82  10-21    134<L>  |  99  |  12  ----------------------------<  121<H>  4.2   |  23  |  0.71    Ca    9.8      22 Oct 2022 05:40  Ca    9.8      21 Oct 2022 04:09  Phos  3.9     10-22  Phos  4.2     10-21  Mg     2.00     10-22  Mg     1.90     10-21          CARDIAC MARKERS:  Creatine Kinase, Serum: 98 U/L (10-22 @ 05:40)  Creatine Kinase, Serum: 88 U/L (10-21 @ 04:09)  Creatine Kinase, Serum: 127 U/L (10-20 @ 02:40)    Serum Pro-Brain Natriuretic Peptide: 45 pg/mL (10-16 @ 15:51)      10-19 @ 00:24  Cholesterol, Serum - 166  Direct LDL- --  HDL Cholesterol, Serum- 30  Triglycerides, Serum- 198      Thyroid Stimulating Hormone, Serum: 16.51 uIU/mL (10-19 @ 00:24)        CAPILLARY BLOOD GLUCOSE          10-16 @ 17:00  229E Corona Virus --  Adenovirus NotDetec  Bordetella pertussis NotDete  Chlamydia pneumoniae NotDete  Entero/Rhino Virus [facility]  HKU1 Coronavirus --  hMPV [facility]  Influenza A [facility]  Influenza AH1 --  Influenza AH1 2009 --  Influenza AH3 --  Influenza B [facility]  Mycoplasma pneumoniae [facility]  NL63 Coronavirus --  OC43 Corornavirus --  Parainfluenza 1 [facility]  Parainfluenza 2 [facility]        Diagnostic testing:  cath:  echo:< from: Transthoracic Echocardiogram (10.19.22 @ 14:46) >  ------------------------------------------------------------------------  DIMENSIONS:  Dimensions:     Normal Values:  LA:     3.4 cm    2.0 - 4.0 cm  Ao:     3.7 cm    2.0 - 3.8 cm  SEPTUM: 0.9 cm    0.6 - 1.2 cm  PWT:    1.0 cm    0.6 - 1.1 cm  LVIDd:  5.4 cm    3.0 - 5.6 cm  LVIDs:  3.4 cm    1.8 - 4.0 cm  Derived Variables:  LVMI: 87 g/m2  RWT: 0.37  Fractional short: 37 %  Ejection Fraction (Visual Estimate): 20-25 %  ------------------------------------------------------------------------  OBSERVATIONS:  Mitral Valve: Mitral annular calcification, otherwisenormal mitral valve. Minimal mitral regurgitation.  Aortic Root: Normal aortic root.  Aortic Valve: Normal trileaflet aortic valve. No aorticvalveregurgitation seen.  Left Atrium: Normal left atrium.  LA volume index = 20cc/m2.  Left Ventricle: Severe segmental left ventricular systolicdysfunction, with hypokinesis of the apex, mid to distalseptum, and mid to distal anterior wall. No obvious LVthrombus noted. Normal left ventricular internal dimensionsand wall thicknesses. Mild diastolic dysfunction (Stage I).Right Heart: Normal right atrium. The right ventricle is  not well visualized; grossly normal right ventricularsystolic function. Normal tricuspid valve. Minimaltricuspid regurgitation. Pulmonic valve not wellvisualized.  Pericardium/PleuraNormal pericardium with no pericardialeffusion.  Hemodynamic: Inadequate tricuspid regurgitation Dopplerenvelope precludes estimation of RVSP.    < end of copied text >        Assessment and Plan:63 yo M PMH current smoker presenting with acute onset mid sternal chest pain with B/L UE numbness s/p viagra use, found to hae LUKASZ in V1-6 and II with T wave inversions in III, s/p NORMAN pLAD with  intra aortic ballon pump placement for concrnig cardiogenic shock . s/p staging to RCA 95% on 10/17  and medical management of Cx.     NEURO  -No issues  -A/O 3    RESP  -No issues  -Satting well on RA    CV  #STEMI with acute systolic heart failure  -EKG NSR with LUKASZ in V1-6 and II with T wave inversions in III  -S/p cath with significant occlusions in RCA and Cx, 99% occlusion of proximal LAD s/p PCI (DESx1) 10/16 with staged RCA (DESx2) on 10/18   - trop peaked at ~7000  -Medical mngmt/outpatient f/u for Cx lesion  - intra aortic balloon pump d/c on 10/21  -S/p ASA and brilinta load 10/16. C/w daily DAPT    -Echo 10/17 with severe segmental LV systolic dysfx and hypokinesis of apex, mid-distal septum and anterior wall, unchanged echo 10/19       #Hypotension likely in the setting of cardiogenic shock - improved   - intra aortic ballon pump and levophed weaned off      #HLD  -Atorvastatin 80 mg daily     GI  -No issues  -DASH diet    /Renal  -No issues    ENDO  -A1c 5.7   -TSH 16.51, free T4 0.9  -Pt with known hypothyroidism and likely component of euthyroid sick syndrom,  - c/w home levothyroxine 75    ID  No issues    Preventive   #DVTppx  - heparin SQ    #Tobacco cessation  -Nicotine patch, smoking cessation counseling and resources offered         Subjective/Objective: patient denies chest pain , sob, unable to sleep well last night . intra aortic ballon pump d/c on 10/21 . Rt groin ecchymosis but  intact        Tele event:NSR    MEDICATIONS    aspirin enteric coated 81 milliGRAM(s) Oral daily  losartan 12.5 milliGRAM(s) Oral daily  ticagrelor 90 milliGRAM(s) Oral every 12 hours  acetaminophen     Tablet .. 650 milliGRAM(s) Oral every 6 hours PRN  melatonin 6 milliGRAM(s) Oral at bedtime  polyethylene glycol 3350 17 Gram(s) Oral daily  senna 1 Tablet(s) Oral daily  atorvastatin 80 milliGRAM(s) Oral at bedtime  levothyroxine 75 MICROGram(s) Oral daily  chlorhexidine 2% Cloths 1 Application(s) Topical daily            REVIEW OF SYSTEMS    General: no fatigue/malaise, weight loss/gain.  Skin: no rashes.  Ophthalmologic: no blurred vision, no loss of vision. 	  ENT: no sore throat, rhinorrhea, sinus congestion.  Cardiovascular:no chest pain ,no palpitation,no dizziness,no diaphoresis,no edema  Respiratory: no SOB, cough or wheeze.  Gastrointestinal:  no N/V/D, no melena/hematemesis/hematochezia.  Genitourinary: no dysuria/hesitancy or hematuria.  Musculoskeletal: no myalgias or arthralgias.  Neurological: no changes in vision or hearing, no lightheadedness/dizziness, no syncope/near syncope	  Psychiatric: no unusual stress/anxiety      	    ICU Vital Signs Last 24 Hrs  T(C): 36.8 (22 Oct 2022 04:00), Max: 37.4 (21 Oct 2022 16:10)  T(F): 98.2 (22 Oct 2022 04:00), Max: 99.4 (21 Oct 2022 16:10)  HR: 87 (22 Oct 2022 06:00) (72 - 101)  BP: 96/64 (22 Oct 2022 06:00) (88/59 - 118/68)  BP(mean): 75 (22 Oct 2022 06:00) (69 - 98)  ABP: 94/51 (21 Oct 2022 11:00) (79/50 - 96/51)  ABP(mean): 71 (21 Oct 2022 11:00) (64 - 73)  RR: 19 (22 Oct 2022 06:00) (13 - 25)  SpO2: 95% (22 Oct 2022 06:00) (91% - 99%)    O2 Parameters below as of 22 Oct 2022 06:00  Patient On (Oxygen Delivery Method): room air            PHYSICAL EXAMINATION  Appearance: NAD, no distress  HEENT: Moist Mucous Membranes, Anicteric, PERRL, EOMI  Cardiovascular: Regular rate and rhythm, Normal S1 S2, No JVD, No murmurs  Respiratory: Lungs clear to auscultation. No rales, No rhonchi, No wheezing.   Gastrointestinal:  Soft, Non-tender, + BS  Neurologic: Non-focal, A&Ox3  Skin: Warm and dry, No rashes, No ecchymosis, No cyanosis, Rt groin intact  Musculoskeletal: No clubbing, No cyanosis, No edema  Vascular: Peripheral pulses palpable 2+ bilaterally  Psychiatry: Mood & affect appropriate      	    		      I&O's Summary    21 Oct 2022 07:01  -  22 Oct 2022 07:00  --------------------------------------------------------  IN: 0 mL / OUT: 2175 mL / NET: -2175 mL    	 	  LABORATORY VALUES	 	                          14.0   10.09 )-----------( 174      ( 22 Oct 2022 05:40 )             41.9       10-22    135  |  100  |  16  ----------------------------<  116<H>  4.7   |  22  |  0.82  10-21    134<L>  |  99  |  12  ----------------------------<  121<H>  4.2   |  23  |  0.71    Ca    9.8      22 Oct 2022 05:40  Ca    9.8      21 Oct 2022 04:09  Phos  3.9     10-22  Phos  4.2     10-21  Mg     2.00     10-22  Mg     1.90     10-21          CARDIAC MARKERS:  Creatine Kinase, Serum: 98 U/L (10-22 @ 05:40)  Creatine Kinase, Serum: 88 U/L (10-21 @ 04:09)  Creatine Kinase, Serum: 127 U/L (10-20 @ 02:40)    Serum Pro-Brain Natriuretic Peptide: 45 pg/mL (10-16 @ 15:51)      10-19 @ 00:24  Cholesterol, Serum - 166  Direct LDL- --  HDL Cholesterol, Serum- 30  Triglycerides, Serum- 198      Thyroid Stimulating Hormone, Serum: 16.51 uIU/mL (10-19 @ 00:24)        CAPILLARY BLOOD GLUCOSE          10-16 @ 17:00  229E Corona Virus --  Adenovirus NotDetec  Bordetella pertussis NotDete  Chlamydia pneumoniae NotDete  Entero/Rhino Virus [facility]  HKU1 Coronavirus --  hMPV [facility]  Influenza A [facility]  Influenza AH1 --  Influenza AH1 2009 --  Influenza AH3 --  Influenza B [facility]  Mycoplasma pneumoniae [facility]  NL63 Coronavirus --  OC43 Corornavirus --  Parainfluenza 1 [facility]  Parainfluenza 2 [facility]        Diagnostic testing:  cath:  echo:< from: Transthoracic Echocardiogram (10.19.22 @ 14:46) >  ------------------------------------------------------------------------  DIMENSIONS:  Dimensions:     Normal Values:  LA:     3.4 cm    2.0 - 4.0 cm  Ao:     3.7 cm    2.0 - 3.8 cm  SEPTUM: 0.9 cm    0.6 - 1.2 cm  PWT:    1.0 cm    0.6 - 1.1 cm  LVIDd:  5.4 cm    3.0 - 5.6 cm  LVIDs:  3.4 cm    1.8 - 4.0 cm  Derived Variables:  LVMI: 87 g/m2  RWT: 0.37  Fractional short: 37 %  Ejection Fraction (Visual Estimate): 20-25 %  ------------------------------------------------------------------------  OBSERVATIONS:  Mitral Valve: Mitral annular calcification, otherwisenormal mitral valve. Minimal mitral regurgitation.  Aortic Root: Normal aortic root.  Aortic Valve: Normal trileaflet aortic valve. No aorticvalveregurgitation seen.  Left Atrium: Normal left atrium.  LA volume index = 20cc/m2.  Left Ventricle: Severe segmental left ventricular systolicdysfunction, with hypokinesis of the apex, mid to distalseptum, and mid to distal anterior wall. No obvious LVthrombus noted. Normal left ventricular internal dimensionsand wall thicknesses. Mild diastolic dysfunction (Stage I).Right Heart: Normal right atrium. The right ventricle is  not well visualized; grossly normal right ventricularsystolic function. Normal tricuspid valve. Minimaltricuspid regurgitation. Pulmonic valve not wellvisualized.  Pericardium/PleuraNormal pericardium with no pericardialeffusion.  Hemodynamic: Inadequate tricuspid regurgitation Dopplerenvelope precludes estimation of RVSP.    < end of copied text >        Assessment and Plan:61 yo M PMH current smoker presenting with acute onset mid sternal chest pain with B/L UE numbness s/p viagra use, found to hae LUKASZ in V1-6 and II with T wave inversions in III, s/p NORMAN pLAD with  intra aortic ballon pump placement for concrnig cardiogenic shock . s/p staging to RCA 95% on 10/17  and medical management of Cx.     NEURO  -No issues  -A/O 3    RESP  -c/o shortness of breath at night when trying to sleep    -Satting well on RA    CV  #STEMI with acute systolic heart failure  -EKG NSR with LUKASZ in V1-6 and II with T wave inversions in III  -S/p cath with significant occlusions in RCA and Cx, 99% occlusion of proximal LAD s/p PCI (DESx1) 10/16 with staged RCA (DESx2) on 10/18   - trop peaked at ~7000  -Medical mngmt/outpatient f/u for Cx lesion  - intra aortic balloon pump d/c on 10/21  -S/p ASA and brilinta load 10/16. C/w daily DAPT    -Echo 10/17 with severe segmental LV systolic dysfx and hypokinesis of apex, mid-distal septum and anterior wall, unchanged echo 10/19       #Hypotension likely in the setting of cardiogenic shock - resolved   - intra aortic ballon pump and levophed weaned off      #HLD  -Atorvastatin 80 mg daily     GI  -No issues  -DASH diet    /Renal  -No issues    ENDO  -A1c 5.7   -TSH 16.51, free T4 0.9  -Pt with known hypothyroidism and likely component of euthyroid sick syndrom,  - c/w home levothyroxine 75    ID  No issues    Preventive   #DVTppx  - heparin SQ    #Tobacco cessation  -Nicotine patch, smoking cessation counseling and resources offered         Subjective/Objective: patient denies chest pain , sob, unable to sleep well last night due to SOB . intra aortic ballon pump d/c on 10/21 . Rt groin ecchymosis but  intact        Tele event:NSR    MEDICATIONS    aspirin enteric coated 81 milliGRAM(s) Oral daily  losartan 12.5 milliGRAM(s) Oral daily  ticagrelor 90 milliGRAM(s) Oral every 12 hours  acetaminophen     Tablet .. 650 milliGRAM(s) Oral every 6 hours PRN  melatonin 6 milliGRAM(s) Oral at bedtime  polyethylene glycol 3350 17 Gram(s) Oral daily  senna 1 Tablet(s) Oral daily  atorvastatin 80 milliGRAM(s) Oral at bedtime  levothyroxine 75 MICROGram(s) Oral daily  chlorhexidine 2% Cloths 1 Application(s) Topical daily            REVIEW OF SYSTEMS    General: no fatigue/malaise, weight loss/gain.  Skin: no rashes.  Ophthalmologic: no blurred vision, no loss of vision. 	  ENT: no sore throat, rhinorrhea, sinus congestion.  Cardiovascular:no chest pain ,no palpitation,no dizziness,no diaphoresis,no edema  Respiratory: no SOB, cough or wheeze.  Gastrointestinal:  no N/V/D, no melena/hematemesis/hematochezia.  Genitourinary: no dysuria/hesitancy or hematuria.  Musculoskeletal: no myalgias or arthralgias.  Neurological: no changes in vision or hearing, no lightheadedness/dizziness, no syncope/near syncope	  Psychiatric: no unusual stress/anxiety      	    ICU Vital Signs Last 24 Hrs  T(C): 36.8 (22 Oct 2022 04:00), Max: 37.4 (21 Oct 2022 16:10)  T(F): 98.2 (22 Oct 2022 04:00), Max: 99.4 (21 Oct 2022 16:10)  HR: 87 (22 Oct 2022 06:00) (72 - 101)  BP: 96/64 (22 Oct 2022 06:00) (88/59 - 118/68)  BP(mean): 75 (22 Oct 2022 06:00) (69 - 98)  ABP: 94/51 (21 Oct 2022 11:00) (79/50 - 96/51)  ABP(mean): 71 (21 Oct 2022 11:00) (64 - 73)  RR: 19 (22 Oct 2022 06:00) (13 - 25)  SpO2: 95% (22 Oct 2022 06:00) (91% - 99%)    O2 Parameters below as of 22 Oct 2022 06:00  Patient On (Oxygen Delivery Method): room air            PHYSICAL EXAMINATION  Appearance: NAD, no distress  HEENT: Moist Mucous Membranes, Anicteric, PERRL, EOMI  Cardiovascular: Regular rate and rhythm, Normal S1 S2, No JVD, No murmurs  Respiratory: Lungs clear to auscultation. No rales, No rhonchi, No wheezing.   Gastrointestinal:  Soft, Non-tender, + BS  Neurologic: Non-focal, A&Ox3  Skin: Warm and dry, No rashes, No ecchymosis, No cyanosis, Rt groin intact  Musculoskeletal: No clubbing, No cyanosis, No edema  Vascular: Peripheral pulses palpable 2+ bilaterally  Psychiatry: Mood & affect appropriate      	    		      I&O's Summary    21 Oct 2022 07:01  -  22 Oct 2022 07:00  --------------------------------------------------------  IN: 0 mL / OUT: 2175 mL / NET: -2175 mL    	 	  LABORATORY VALUES	 	                          14.0   10.09 )-----------( 174      ( 22 Oct 2022 05:40 )             41.9       10-22    135  |  100  |  16  ----------------------------<  116<H>  4.7   |  22  |  0.82  10-21    134<L>  |  99  |  12  ----------------------------<  121<H>  4.2   |  23  |  0.71    Ca    9.8      22 Oct 2022 05:40  Ca    9.8      21 Oct 2022 04:09  Phos  3.9     10-22  Phos  4.2     10-21  Mg     2.00     10-22  Mg     1.90     10-21          CARDIAC MARKERS:  Creatine Kinase, Serum: 98 U/L (10-22 @ 05:40)  Creatine Kinase, Serum: 88 U/L (10-21 @ 04:09)  Creatine Kinase, Serum: 127 U/L (10-20 @ 02:40)    Serum Pro-Brain Natriuretic Peptide: 45 pg/mL (10-16 @ 15:51)      10-19 @ 00:24  Cholesterol, Serum - 166  Direct LDL- --  HDL Cholesterol, Serum- 30  Triglycerides, Serum- 198      Thyroid Stimulating Hormone, Serum: 16.51 uIU/mL (10-19 @ 00:24)        CAPILLARY BLOOD GLUCOSE          10-16 @ 17:00  229E Corona Virus --  Adenovirus NotDetec  Bordetella pertussis NotDete  Chlamydia pneumoniae NotDete  Entero/Rhino Virus NotDete  HKU1 Coronavirus --  hMPV NotDete  [facility]  Influenza AH1 --  Influenza AH1 2009 --  Influenza AH3 --  Influenza B [facility]  Mycoplasma pneumoniae [facility]  NL63 Coronavirus --  OC43 Corornavirus --  Parainfluenza 1 [facility]  Parainfluenza 2 [facility]        Diagnostic testing:  cath:  echo:< from: Transthoracic Echocardiogram (10.19.22 @ 14:46) >  ------------------------------------------------------------------------  DIMENSIONS:  Dimensions:     Normal Values:  LA:     3.4 cm    2.0 - 4.0 cm  Ao:     3.7 cm    2.0 - 3.8 cm  SEPTUM: 0.9 cm    0.6 - 1.2 cm  PWT:    1.0 cm    0.6 - 1.1 cm  LVIDd:  5.4 cm    3.0 - 5.6 cm  LVIDs:  3.4 cm    1.8 - 4.0 cm  Derived Variables:  LVMI: 87 g/m2  RWT: 0.37  Fractional short: 37 %  Ejection Fraction (Visual Estimate): 20-25 %  ------------------------------------------------------------------------  OBSERVATIONS:  Mitral Valve: Mitral annular calcification, otherwisenormal mitral valve. Minimal mitral regurgitation.  Aortic Root: Normal aortic root.  Aortic Valve: Normal trileaflet aortic valve. No aorticvalveregurgitation seen.  Left Atrium: Normal left atrium.  LA volume index = 20cc/m2.  Left Ventricle: Severe segmental left ventricular systolicdysfunction, with hypokinesis of the apex, mid to distalseptum, and mid to distal anterior wall. No obvious LVthrombus noted. Normal left ventricular internal dimensionsand wall thicknesses. Mild diastolic dysfunction (Stage I).Right Heart: Normal right atrium. The right ventricle is  not well visualized; grossly normal right ventricularsystolic function. Normal tricuspid valve. Minimaltricuspid regurgitation. Pulmonic valve not wellvisualized.  Pericardium/PleuraNormal pericardium with no pericardialeffusion.  Hemodynamic: Inadequate tricuspid regurgitation Dopplerenvelope precludes estimation of RVSP.    < end of copied text >        Assessment and Plan:61 yo M PMH current smoker presenting with acute onset mid sternal chest pain with B/L UE numbness s/p viagra use, found to hae LUKASZ in V1-6 and II with T wave inversions in III, s/p NORMAN pLAD with  intra aortic ballon pump placement for concrnig cardiogenic shock . s/p staging to RCA 95% on 10/17  and medical management of Cx.     NEURO  -No issues  -A/O 3    RESP  -c/o shortness of breath at night when trying to sleep    -Satting well on RA    CV  #STEMI with acute systolic heart failure  -EKG NSR with LUKASZ in V1-6 and II with T wave inversions in III  -S/p cath with significant occlusions in RCA and Cx, 99% occlusion of proximal LAD s/p PCI (DESx1) 10/16 with staged RCA (DESx2) on 10/18   - trop peaked at ~7000  -Medical mngmt/outpatient f/u for Cx lesion  - intra aortic balloon pump d/c on 10/21  -S/p ASA and brilinta load 10/16. C/w daily DAPT    -Echo 10/17 with severe segmental LV systolic dysfx and hypokinesis of apex, mid-distal septum and anterior wall, unchanged echo 10/19   - c/w losartan       #Hypotension likely in the setting of cardiogenic shock - resolved   - intra aortic ballon pump and levophed weaned off      #HLD  -Atorvastatin 80 mg daily     GI  -No issues  -DASH diet    /Renal  -No issues    ENDO  -A1c 5.7   -TSH 16.51, free T4 0.9  -Pt with known hypothyroidism and likely component of euthyroid sick syndrom,  - c/w home levothyroxine 75    ID  No issues    Preventive   #DVTppx  - heparin SQ    #Tobacco cessation  -Nicotine patch, smoking cessation counseling and resources offered         Subjective/Objective: patient denies chest pain , sob, unable to sleep well last night due to SOB . intra aortic ballon pump d/c on 10/21 . Rt groin ecchymosis but  intact        Tele event:NSR    MEDICATIONS    aspirin enteric coated 81 milliGRAM(s) Oral daily  losartan 12.5 milliGRAM(s) Oral daily  ticagrelor 90 milliGRAM(s) Oral every 12 hours  acetaminophen     Tablet .. 650 milliGRAM(s) Oral every 6 hours PRN  melatonin 6 milliGRAM(s) Oral at bedtime  polyethylene glycol 3350 17 Gram(s) Oral daily  senna 1 Tablet(s) Oral daily  atorvastatin 80 milliGRAM(s) Oral at bedtime  levothyroxine 75 MICROGram(s) Oral daily  chlorhexidine 2% Cloths 1 Application(s) Topical daily            REVIEW OF SYSTEMS    General: no fatigue/malaise, weight loss/gain.  Skin: no rashes.  Ophthalmologic: no blurred vision, no loss of vision. 	  ENT: no sore throat, rhinorrhea, sinus congestion.  Cardiovascular:no chest pain ,no palpitation,no dizziness,no diaphoresis,no edema  Respiratory: no SOB, cough or wheeze.  Gastrointestinal:  no N/V/D, no melena/hematemesis/hematochezia.  Genitourinary: no dysuria/hesitancy or hematuria.  Musculoskeletal: no myalgias or arthralgias.  Neurological: no changes in vision or hearing, no lightheadedness/dizziness, no syncope/near syncope	  Psychiatric: no unusual stress/anxiety      	    ICU Vital Signs Last 24 Hrs  T(C): 36.8 (22 Oct 2022 04:00), Max: 37.4 (21 Oct 2022 16:10)  T(F): 98.2 (22 Oct 2022 04:00), Max: 99.4 (21 Oct 2022 16:10)  HR: 87 (22 Oct 2022 06:00) (72 - 101)  BP: 96/64 (22 Oct 2022 06:00) (88/59 - 118/68)  BP(mean): 75 (22 Oct 2022 06:00) (69 - 98)  ABP: 94/51 (21 Oct 2022 11:00) (79/50 - 96/51)  ABP(mean): 71 (21 Oct 2022 11:00) (64 - 73)  RR: 19 (22 Oct 2022 06:00) (13 - 25)  SpO2: 95% (22 Oct 2022 06:00) (91% - 99%)    O2 Parameters below as of 22 Oct 2022 06:00  Patient On (Oxygen Delivery Method): room air            PHYSICAL EXAMINATION  Appearance: NAD, no distress  HEENT: Moist Mucous Membranes, Anicteric, PERRL, EOMI  Cardiovascular: Regular rate and rhythm, Normal S1 S2, No JVD, No murmurs  Respiratory: Lungs clear to auscultation. No rales, No rhonchi, No wheezing.   Gastrointestinal:  Soft, Non-tender, + BS  Neurologic: Non-focal, A&Ox3  Skin: Warm and dry, No rashes, No ecchymosis, No cyanosis, Rt groin intact  Musculoskeletal: No clubbing, No cyanosis, No edema  Vascular: Peripheral pulses palpable 2+ bilaterally  Psychiatry: Mood & affect appropriate      	    		      I&O's Summary    21 Oct 2022 07:01  -  22 Oct 2022 07:00  --------------------------------------------------------  IN: 0 mL / OUT: 2175 mL / NET: -2175 mL    	 	  LABORATORY VALUES	 	                          14.0   10.09 )-----------( 174      ( 22 Oct 2022 05:40 )             41.9       10-22    135  |  100  |  16  ----------------------------<  116<H>  4.7   |  22  |  0.82  10-21    134<L>  |  99  |  12  ----------------------------<  121<H>  4.2   |  23  |  0.71    Ca    9.8      22 Oct 2022 05:40  Ca    9.8      21 Oct 2022 04:09  Phos  3.9     10-22  Phos  4.2     10-21  Mg     2.00     10-22  Mg     1.90     10-21          CARDIAC MARKERS:  Creatine Kinase, Serum: 98 U/L (10-22 @ 05:40)  Creatine Kinase, Serum: 88 U/L (10-21 @ 04:09)  Creatine Kinase, Serum: 127 U/L (10-20 @ 02:40)    Serum Pro-Brain Natriuretic Peptide: 45 pg/mL (10-16 @ 15:51)      10-19 @ 00:24  Cholesterol, Serum - 166  Direct LDL- --  HDL Cholesterol, Serum- 30  Triglycerides, Serum- 198      Thyroid Stimulating Hormone, Serum: 16.51 uIU/mL (10-19 @ 00:24)        CAPILLARY BLOOD GLUCOSE          10-16 @ 17:00  229E Corona Virus --  Adenovirus NotDetec  Bordetella pertussis NotDete  Chlamydia pneumoniae NotDete  Entero/Rhino Virus NotDete  HKU1 Coronavirus --  hMPV [facility]  [facility]  Influenza AH1 --  Influenza AH1 2009 --  Influenza AH3 --  Influenza B [facility]  Mycoplasma pneumoniae [facility]  NL63 Coronavirus --  OC43 Corornavirus --  Parainfluenza 1 [facility]  Parainfluenza 2 [facility]        Diagnostic testing:  cath:  echo:< from: Transthoracic Echocardiogram (10.19.22 @ 14:46) >  ------------------------------------------------------------------------  DIMENSIONS:  Dimensions:     Normal Values:  LA:     3.4 cm    2.0 - 4.0 cm  Ao:     3.7 cm    2.0 - 3.8 cm  SEPTUM: 0.9 cm    0.6 - 1.2 cm  PWT:    1.0 cm    0.6 - 1.1 cm  LVIDd:  5.4 cm    3.0 - 5.6 cm  LVIDs:  3.4 cm    1.8 - 4.0 cm  Derived Variables:  LVMI: 87 g/m2  RWT: 0.37  Fractional short: 37 %  Ejection Fraction (Visual Estimate): 20-25 %  ------------------------------------------------------------------------  OBSERVATIONS:  Mitral Valve: Mitral annular calcification, otherwisenormal mitral valve. Minimal mitral regurgitation.  Aortic Root: Normal aortic root.  Aortic Valve: Normal trileaflet aortic valve. No aorticvalveregurgitation seen.  Left Atrium: Normal left atrium.  LA volume index = 20cc/m2.  Left Ventricle: Severe segmental left ventricular systolicdysfunction, with hypokinesis of the apex, mid to distalseptum, and mid to distal anterior wall. No obvious LVthrombus noted. Normal left ventricular internal dimensionsand wall thicknesses. Mild diastolic dysfunction (Stage I).Right Heart: Normal right atrium. The right ventricle is  not well visualized; grossly normal right ventricularsystolic function. Normal tricuspid valve. Minimaltricuspid regurgitation. Pulmonic valve not wellvisualized.  Pericardium/PleuraNormal pericardium with no pericardialeffusion.  Hemodynamic: Inadequate tricuspid regurgitation Dopplerenvelope precludes estimation of RVSP.    < end of copied text >        Assessment and Plan:61 yo M PMH hyperlipidemia, hypothyroidism, current smoker presenting with acute onset mid sternal chest pain with B/L UE numbness s/p viagra use, found to hae LUKASZ in V1-6 and II with T wave inversions in III, s/p NORMAN pLAD with  intra aortic ballon pump placement for concrnig cardiogenic shock . s/p staging to RCA 95% on 10/17  and medical management of Cx.     NEURO  -No issues  -A/O 3    RESP  -c/o shortness of breath at night when trying to sleep    -Satting well on RA    CV  #STEMI with acute systolic heart failure  -EKG NSR with LUKASZ in V1-6 and II with T wave inversions in III  -S/p cath with significant occlusions in RCA and Cx, 99% occlusion of proximal LAD s/p PCI (DESx1) 10/16 with staged RCA (DESx2) on 10/18   - trop peaked at ~7000  -Medical mngmt/outpatient f/u for Cx lesion  - intra aortic balloon pump d/c on 10/21  -S/p ASA and brilinta load 10/16. C/w daily DAPT    -Echo 10/17 with severe segmental LV systolic dysfx and hypokinesis of apex, mid-distal septum and anterior wall, unchanged echo 10/19   - c/w losartan       #Hypotension likely in the setting of cardiogenic shock - resolved   - intra aortic ballon pump and levophed weaned off      #HLD  -Atorvastatin 80 mg daily     GI  -No issues  -DASH diet    /Renal  -No issues    ENDO  -A1c 5.7   -TSH 16.51, free T4 0.9  -Pt with known hypothyroidism and likely component of euthyroid sick syndrom,  - c/w home levothyroxine 75    ID  No issues    Preventive   #DVTppx  - heparin SQ    #Tobacco cessation  -Nicotine patch, smoking cessation counseling and resources offered

## 2022-10-22 NOTE — CHART NOTE - NSCHARTNOTEFT_GEN_A_CORE
Price check on Brilinta via [facility] cost $600/month. With free coupon, co pay is free for 1st one month. Discuss with patient. He refer to take Brilinta. He agrees to do free coupon for 1 month via [facility]. He will get mail prescription via [facility] for half price. Patient understand the importance of taking Brilinta.

## 2022-10-22 NOTE — CHART NOTE - NSCHARTNOTEFT_GEN_A_CORE
CCU Transfer Note    Transfer from: CCU  Transfer to:  (  x) Medicine    ( x ) Telemetry    (  ) RCU         HPI/ CCU :61 yo M PMH current smoker presenting with acute onset mid sternal chest pain with B/L UE numbness s/p viagra use, found to hae LUKASZ in V1-6 and II with T wave inversions in III, s/p NORMAN pLAD with  intra aortic ballon pump placement for concerning cardiogenic shock . Also required levophed. s/p staging to RCA 95% on 10/17  and medical management of Cx.  Continue  DAPT and statin. Started on  losartan.Echo showed low EF 20- 25%   Severe segmental left ventricular systolic,  dysfunction, and wall thicknesses. Started on low dose lasix Po for compliant of shortness of breath at night        MEDICATIONS:  STANDING MEDICATIONS  aspirin enteric coated 81 milliGRAM(s) Oral daily  atorvastatin 80 milliGRAM(s) Oral at bedtime  chlorhexidine 2% Cloths 1 Application(s) Topical daily  furosemide    Tablet 20 milliGRAM(s) Oral daily  levothyroxine 75 MICROGram(s) Oral daily  losartan 12.5 milliGRAM(s) Oral daily  melatonin 6 milliGRAM(s) Oral at bedtime  nicotine -  14 mG/24Hr(s) Patch 1 Patch Transdermal daily  polyethylene glycol 3350 17 Gram(s) Oral daily  senna 1 Tablet(s) Oral daily  ticagrelor 90 milliGRAM(s) Oral every 12 hours    PRN MEDICATIONS  acetaminophen     Tablet .. 650 milliGRAM(s) Oral every 6 hours PRN      VITAL SIGNS: Last 24 Hours  T(C): 36.7 (22 Oct 2022 08:00), Max: 37.4 (21 Oct 2022 16:10)  T(F): 98.1 (22 Oct 2022 08:00), Max: 99.4 (21 Oct 2022 16:10)  HR: 93 (22 Oct 2022 10:00) (74 - 101)  BP: 101/64 (22 Oct 2022 10:00) (88/59 - 118/68)  BP(mean): 75 (22 Oct 2022 10:00) (69 - 98)  RR: 25 (22 Oct 2022 10:00) (13 - 25)  SpO2: 94% (22 Oct 2022 10:00) (91% - 99%)    LABS:                        14.0   10.09 )-----------( 174      ( 22 Oct 2022 05:40 )             41.9     10-22    135  |  100  |  16  ----------------------------<  116<H>  4.7   |  22  |  0.82    Ca    9.8      22 Oct 2022 05:40  Phos  3.9     10-22  Mg     2.00     10-22      PT/INR - ( 21 Oct 2022 04:09 )   PT: 13.5 sec;   INR: 1.16 ratio         PTT - ( 21 Oct 2022 04:09 )  PTT:74.7 sec        CARDIAC MARKERS ( 22 Oct 2022 05:40 )  x     / x     / 98 U/L / x     / x      CARDIAC MARKERS ( 21 Oct 2022 04:09 )  x     / x     / 88 U/L / x     / x          RADIOLOGY:              ASSESSMENT & PLAN: 61 yo M PMH current smoker presenting with acute onset mid sternal chest pain with B/L UE numbness s/p viagra use, found to hae LUKASZ in V1-6 and II with T wave inversions in III, s/p NORMAN pLAD with  intra aortic ballon pump placement for concrnig cardiogenic shock . s/p staging to RCA 95% on 10/17  and medical management of Cx.     NEURO  -No issues  -A/O 3    RESP  -c/o shortness of breath at night when trying to sleep    -Satting well on RA    CV  #STEMI with acute systolic heart failure  -EKG NSR with LUKASZ in V1-6 and II with T wave inversions in III  -S/p cath with significant occlusions in RCA and Cx, 99% occlusion of proximal LAD s/p PCI (DESx1) 10/16 with staged RCA (DESx2) on 10/18   - trop peaked at ~7000  -Medical mngmt/outpatient f/u for Cx lesion  - intra aortic balloon pump d/c on 10/21  -S/p ASA and brilinta load 10/16. 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,  - c/w home levothyroxine 75    ID  No issues    Preventive   #DVTppx  - heparin SQ    #Tobacco cessation  -Nicotine patch, smoking cessation counseling and resources offered CCU Transfer Note    Transfer from: CCU  Transfer to:  (  x) Medicine    ( x ) Telemetry    (  ) RCU     Accepting Physicain: John      HPI/ CCU :63 yo M PMH hyperlipidemia, hypothyroidism current smoker presenting with acute onset mid sternal chest pain with B/L UE numbness s/p viagra use, found to hae LUKASZ in V1-6 and II with T wave inversions in III, s/p NORMAN pLAD with  intra aortic ballon pump placement for concerning cardiogenic shock . Also required levophed. s/p staging to RCA 95% on 10/18  and medical management of Cx.  Continue  DAPT and statin. Started on  losartan.Echo showed low EF 20- 25% Severe segmental left ventricular systolic,  dysfunction, and wall thicknesses. Started on low dose lasix Po for compliant of shortness of breath at night        MEDICATIONS:  STANDING MEDICATIONS  aspirin enteric coated 81 milliGRAM(s) Oral daily  atorvastatin 80 milliGRAM(s) Oral at bedtime  chlorhexidine 2% Cloths 1 Application(s) Topical daily  furosemide    Tablet 20 milliGRAM(s) Oral daily  levothyroxine 75 MICROGram(s) Oral daily  losartan 12.5 milliGRAM(s) Oral daily  melatonin 6 milliGRAM(s) Oral at bedtime  nicotine -  14 mG/24Hr(s) Patch 1 Patch Transdermal daily  polyethylene glycol 3350 17 Gram(s) Oral daily  senna 1 Tablet(s) Oral daily  ticagrelor 90 milliGRAM(s) Oral every 12 hours    PRN MEDICATIONS  acetaminophen     Tablet .. 650 milliGRAM(s) Oral every 6 hours PRN      VITAL SIGNS: Last 24 Hours  T(C): 36.7 (22 Oct 2022 08:00), Max: 37.4 (21 Oct 2022 16:10)  T(F): 98.1 (22 Oct 2022 08:00), Max: 99.4 (21 Oct 2022 16:10)  HR: 93 (22 Oct 2022 10:00) (74 - 101)  BP: 101/64 (22 Oct 2022 10:00) (88/59 - 118/68)  BP(mean): 75 (22 Oct 2022 10:00) (69 - 98)  RR: 25 (22 Oct 2022 10:00) (13 - 25)  SpO2: 94% (22 Oct 2022 10:00) (91% - 99%)    LABS:                        14.0   10.09 )-----------( 174      ( 22 Oct 2022 05:40 )             41.9     10-22    135  |  100  |  16  ----------------------------<  116<H>  4.7   |  22  |  0.82    Ca    9.8      22 Oct 2022 05:40  Phos  3.9     10-22  Mg     2.00     10-22      PT/INR - ( 21 Oct 2022 04:09 )   PT: 13.5 sec;   INR: 1.16 ratio         PTT - ( 21 Oct 2022 04:09 )  PTT:74.7 sec        CARDIAC MARKERS ( 22 Oct 2022 05:40 )  x     / x     / 98 U/L / x     / x      CARDIAC MARKERS ( 21 Oct 2022 04:09 )  x     / x     / 88 U/L / x     / x          RADIOLOGY:            ASSESSMENT & PLAN: 63 yo M PMH current smoker presenting with acute onset mid sternal chest pain with B/L UE numbness s/p viagra use, found to hae LUKASZ in V1-6 and II with T wave inversions in III, s/p NORMAN pLAD with  intra aortic ballon pump placement for concrnig cardiogenic shock . s/p staging to RCA 95% on 10/17  and medical management of Cx.     NEURO  -No issues  -A/O 3    RESP  -c/o shortness of breath at night when trying to sleep    -Satting well on RA    CV  #STEMI with acute systolic heart failure  -EKG NSR with LUKASZ in V1-6 and II with T wave inversions in III  -S/p cath with significant occlusions in RCA and Cx, 99% occlusion of proximal LAD s/p PCI (DESx1) 10/16 with staged RCA (DESx2) on 10/18   - trop peaked at ~7000  -Medical mngmt/ outpatient f/u for Cx lesion  - intra aortic balloon pump d/c on 10/21  -S/p ASA and Brilinta load 10/16. 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

## 2022-10-22 NOTE — PROGRESS NOTE ADULT - NS ATTEND AMEND GEN_ALL_CORE FT
Patient seen and examined during morning CCU rounds.  Assessment and plan were reviewed with the entire CCU team, and as outlined above.  Still with subjective symptoms of orthopnea/dyspnea.  Will give IV furosemide for symptoms likely attributed to hypervolemia in setting of ADHF/ICM.  Probable discharge tomorrow.

## 2022-10-22 NOTE — DISCHARGE NOTE PROVIDER - NSDCMRMEDTOKEN_GEN_ALL_CORE_FT
Brilinta (ticagrelor) 90 mg oral tablet: 1 tab(s) orally 2 times a day   levothyroxine 75 mcg (0.075 mg) oral tablet: 1 tab(s) orally once a day  Zetia 10 mg oral tablet: 1 tab(s) orally once a day   aspirin 81 mg oral delayed release tablet: 1 tab(s) orally once a day  atorvastatin 80 mg oral tablet: 1 tab(s) orally once a day (at bedtime)  clopidogrel 75 mg oral tablet: 1 tab(s) orally once a day   furosemide 20 mg oral tablet: 1 tab(s) orally once a day  levothyroxine 75 mcg (0.075 mg) oral tablet: 1 tab(s) orally once a day  losartan 25 mg oral tablet: 0.5 tab(s) orally once a day   nicotine 14 mg/24 hr transdermal film, extended release: 1 patch transdermal once a day  Zetia 10 mg oral tablet: 1 tab(s) orally once a day

## 2022-10-22 NOTE — DISCHARGE NOTE PROVIDER - HOSPITAL COURSE
:61 yo M PMH hyperlipidemia, hypothyroidism current smoker presenting with acute onset mid sternal chest pain with B/L UE numbness s/p viagra use, found to hae LUKASZ in V1-6 and II with T wave inversions in III, s/p NORMAN pLAD with  intra aortic ballon pump placement for concerning cardiogenic shock . Also required levophed. s/p staging to RCA 95% on 10/18  and medical management of Cx.  Continue  DAPT and statin. Started on  losartan.Echo showed low EF 20- 25% Severe segmental left ventricular systolic,  dysfunction, and wall thicknesses. Started on low dose lasix Po for compliant of shortness of breath at night. :63 yo M PMH hyperlipidemia, hypothyroidism current smoker presenting with acute onset mid sternal chest pain with B/L UE numbness s/p viagra use, found to hae LUKASZ in V1-6 and II with T wave inversions in III, s/p NORMAN pLAD (10/16) with intra aortic balloon pump placement for concerning cardiogenic shock. Also required levophed iso low augmenting pressures. Pt s/p staging to RCA 95% (DESx2) on 10/18  and medical management of Cx. Continue  DAPT and statin. Started on losartan 10/21. Echo showed low EF 20- 25%, severe segmental left ventricular systolic, dysfunction, and wall thicknesses. Started on low dose lasix PO for compliant of shortness of breath at night. 61 yo M PMH hyperlipidemia, hypothyroidism, current smoker presenting with acute onset mid sternal chest pain with B/L UE numbness s/p viagra use, found to have LUKASZ in V1-6 and II with T wave inversions in III, s/p NORMAN pLAD (10/16) with intra aortic balloon pump placement for concerning cardiogenic shock. Echo showed low EF 20- 25%, severe segmental left ventricular systolic, dysfunction, and wall thicknesses on admission and 48 hours later. Also required levophed iso low augmenting pressures. Pt s/p staging to RCA 95% (DESx2) on 10/18  and medical management of Cx. He remained on IABP from 10/16-10/21, when levophed and IABP were able to be weaned and removed. He was continued on DAPT and statin and started on losartan 10/21. Started on low dose lasix PO for compliant of shortness of breath at night. On day of discharge, pt was loaded with plavix given brilinta cost. Pt medically stable on day of discharge.

## 2022-10-23 ENCOUNTER — TRANSCRIPTION ENCOUNTER (OUTPATIENT)
Age: 62
End: 2022-10-23

## 2022-10-23 VITALS
RESPIRATION RATE: 20 BRPM | SYSTOLIC BLOOD PRESSURE: 100 MMHG | OXYGEN SATURATION: 97 % | DIASTOLIC BLOOD PRESSURE: 60 MMHG | HEART RATE: 83 BPM

## 2022-10-23 LAB
ANION GAP SERPL CALC-SCNC: 12 MMOL/L — SIGNIFICANT CHANGE UP (ref 7–14)
BUN SERPL-MCNC: 16 MG/DL — SIGNIFICANT CHANGE UP (ref 7–23)
CALCIUM SERPL-MCNC: 9.6 MG/DL — SIGNIFICANT CHANGE UP (ref 8.4–10.5)
CHLORIDE SERPL-SCNC: 100 MMOL/L — SIGNIFICANT CHANGE UP (ref 98–107)
CK SERPL-CCNC: 81 U/L — SIGNIFICANT CHANGE UP (ref 30–200)
CO2 SERPL-SCNC: 22 MMOL/L — SIGNIFICANT CHANGE UP (ref 22–31)
CREAT SERPL-MCNC: 0.78 MG/DL — SIGNIFICANT CHANGE UP (ref 0.5–1.3)
EGFR: 102 ML/MIN/1.73M2 — SIGNIFICANT CHANGE UP
GLUCOSE SERPL-MCNC: 114 MG/DL — HIGH (ref 70–99)
HCT VFR BLD CALC: 42.6 % — SIGNIFICANT CHANGE UP (ref 39–50)
HGB BLD-MCNC: 14.5 G/DL — SIGNIFICANT CHANGE UP (ref 13–17)
MAGNESIUM SERPL-MCNC: 2.1 MG/DL — SIGNIFICANT CHANGE UP (ref 1.6–2.6)
MCHC RBC-ENTMCNC: 31.7 PG — SIGNIFICANT CHANGE UP (ref 27–34)
MCHC RBC-ENTMCNC: 34 GM/DL — SIGNIFICANT CHANGE UP (ref 32–36)
MCV RBC AUTO: 93.2 FL — SIGNIFICANT CHANGE UP (ref 80–100)
NRBC # BLD: 0 /100 WBCS — SIGNIFICANT CHANGE UP (ref 0–0)
NRBC # FLD: 0 K/UL — SIGNIFICANT CHANGE UP (ref 0–0)
PHOSPHATE SERPL-MCNC: 3.8 MG/DL — SIGNIFICANT CHANGE UP (ref 2.5–4.5)
PLATELET # BLD AUTO: 186 K/UL — SIGNIFICANT CHANGE UP (ref 150–400)
POTASSIUM SERPL-MCNC: 4.6 MMOL/L — SIGNIFICANT CHANGE UP (ref 3.5–5.3)
POTASSIUM SERPL-SCNC: 4.6 MMOL/L — SIGNIFICANT CHANGE UP (ref 3.5–5.3)
RBC # BLD: 4.57 M/UL — SIGNIFICANT CHANGE UP (ref 4.2–5.8)
RBC # FLD: 12.7 % — SIGNIFICANT CHANGE UP (ref 10.3–14.5)
SARS-COV-2 RNA SPEC QL NAA+PROBE: SIGNIFICANT CHANGE UP
SODIUM SERPL-SCNC: 134 MMOL/L — LOW (ref 135–145)
WBC # BLD: 9.71 K/UL — SIGNIFICANT CHANGE UP (ref 3.8–10.5)
WBC # FLD AUTO: 9.71 K/UL — SIGNIFICANT CHANGE UP (ref 3.8–10.5)

## 2022-10-23 PROCEDURE — 99233 SBSQ HOSP IP/OBS HIGH 50: CPT

## 2022-10-23 RX ORDER — NICOTINE POLACRILEX 2 MG
1 GUM BUCCAL
Qty: 30 | Refills: 0
Start: 2022-10-23 | End: 2022-11-21

## 2022-10-23 RX ORDER — LOSARTAN POTASSIUM 100 MG/1
0.5 TABLET, FILM COATED ORAL
Qty: 30 | Refills: 0
Start: 2022-10-23 | End: 2022-12-21

## 2022-10-23 RX ORDER — ASPIRIN/CALCIUM CARB/MAGNESIUM 324 MG
1 TABLET ORAL
Qty: 60 | Refills: 0
Start: 2022-10-23 | End: 2022-12-21

## 2022-10-23 RX ORDER — CLOPIDOGREL BISULFATE 75 MG/1
1 TABLET, FILM COATED ORAL
Qty: 60 | Refills: 0
Start: 2022-10-23 | End: 2022-12-21

## 2022-10-23 RX ORDER — FUROSEMIDE 40 MG
1 TABLET ORAL
Qty: 60 | Refills: 0
Start: 2022-10-23 | End: 2022-12-21

## 2022-10-23 RX ORDER — ATORVASTATIN CALCIUM 80 MG/1
1 TABLET, FILM COATED ORAL
Qty: 60 | Refills: 0
Start: 2022-10-23 | End: 2022-12-21

## 2022-10-23 RX ORDER — CLOPIDOGREL BISULFATE 75 MG/1
600 TABLET, FILM COATED ORAL ONCE
Refills: 0 | Status: COMPLETED | OUTPATIENT
Start: 2022-10-23 | End: 2022-10-23

## 2022-10-23 RX ADMIN — Medication 75 MICROGRAM(S): at 05:24

## 2022-10-23 RX ADMIN — LOSARTAN POTASSIUM 12.5 MILLIGRAM(S): 100 TABLET, FILM COATED ORAL at 05:24

## 2022-10-23 RX ADMIN — Medication 1 PATCH: at 07:24

## 2022-10-23 RX ADMIN — TICAGRELOR 90 MILLIGRAM(S): 90 TABLET ORAL at 05:24

## 2022-10-23 RX ADMIN — CLOPIDOGREL BISULFATE 600 MILLIGRAM(S): 75 TABLET, FILM COATED ORAL at 09:22

## 2022-10-23 RX ADMIN — Medication 20 MILLIGRAM(S): at 09:20

## 2022-10-23 NOTE — DISCHARGE NOTE NURSING/CASE MANAGEMENT/SOCIAL WORK - PATIENT PORTAL LINK FT
You can access the FollowMyHealth Patient Portal offered by [facility] by registering at the following website: [email or web address]. By joining [facility]’s FollowMyHealth portal, you will also be able to view your health information using other applications (apps) compatible with our system.

## 2022-10-23 NOTE — PROGRESS NOTE ADULT - SUBJECTIVE AND OBJECTIVE BOX
Patient is a 62y old  Male who presents with a chief complaint of STEMI (22 Oct 2022 11:48)    HPI:  This is a 61 yo M PMH HLD, hypothyroidism, current smoker presenting with acute onset mid sternal chest pain with B/L UE numbness s/p viagra use, found to have LUKASZ in V1-6 and II with T wave inversion in III. Patient's wife states 15 minutes s/p sexual intercourse, pt started having severe crushing chest pain with dizziness and diaphoresis. Pt became less alert and wife used ice water to wake him without success, so she called EMS. Pt received ASA and brilinta load in ED as well as 400 U heparin. Pt was taken to cath lab. Intraortic balloon pump placed for EF 25%, to keep RCA and CX patent until staging, and for hypotension. Pt planned for staged PCI to RCA and Cx this week.                                                                                                                                                                                       (16 Oct 2022 16:15)       INTERVAL HPI/OVERNIGHT EVENTS:   No overnight events   Afebrile, hemodynamically stable     Subjective: No overall complaints. Ambulating without issues.     ICU Vital Signs Last 24 Hrs  T(C): 37.1 (23 Oct 2022 08:00), Max: 37.2 (22 Oct 2022 20:00)  T(F): 98.8 (23 Oct 2022 08:00), Max: 99 (22 Oct 2022 20:00)  HR: 74 (23 Oct 2022 06:00) (73 - 96)  BP: 80/62 (23 Oct 2022 06:00) (80/62 - 111/80)  BP(mean): 69 (23 Oct 2022 06:00) (61 - 91)  ABP: --  ABP(mean): --  RR: 15 (23 Oct 2022 06:00) (14 - 42)  SpO2: 99% (23 Oct 2022 06:00) (91% - 99%)    O2 Parameters below as of 23 Oct 2022 06:00  Patient On (Oxygen Delivery Method): room air          I&O's Summary    22 Oct 2022 07:01  -  23 Oct 2022 07:00  --------------------------------------------------------  IN: 0 mL / OUT: 2275 mL / NET: -2275 mL          Daily     Daily Weight in k.2 (23 Oct 2022 02:00)    EKG/Telemetry Events:    MEDICATIONS  (STANDING):  aspirin enteric coated 81 milliGRAM(s) Oral daily  atorvastatin 80 milliGRAM(s) Oral at bedtime  chlorhexidine 2% Cloths 1 Application(s) Topical daily  furosemide    Tablet 20 milliGRAM(s) Oral daily  levothyroxine 75 MICROGram(s) Oral daily  losartan 12.5 milliGRAM(s) Oral daily  melatonin 6 milliGRAM(s) Oral at bedtime  nicotine -  14 mG/24Hr(s) Patch 1 Patch Transdermal daily  polyethylene glycol 3350 17 Gram(s) Oral daily  senna 1 Tablet(s) Oral daily  ticagrelor 90 milliGRAM(s) Oral every 12 hours    MEDICATIONS  (PRN):  acetaminophen     Tablet .. 650 milliGRAM(s) Oral every 6 hours PRN Moderate Pain (4 - 6)  calamine/zinc oxide Lotion 1 Application(s) Topical two times a day PRN Rash and/or Itching      PHYSICAL EXAM:  GENERAL: NAD, A/O 3  HEAD:  Atraumatic, Normocephalic  EYES: EOMI, PERRLA, conjunctiva and sclera clear  NECK: Supple, No JVD, Normal thyroid, no enlarged nodes  NERVOUS SYSTEM:  Alert & Awake.   CHEST/LUNG: B/L good air entry; No rales, rhonchi, or wheezing  HEART: S1S2 normal, no S3, Regular rate and rhythm; No murmurs  ABDOMEN: Soft, Nontender, Nondistended; Bowel sounds present  EXTREMITIES:  2+ Peripheral Pulses, No clubbing, cyanosis, or edema  LYMPH: No lymphadenopathy noted  SKIN: No rashes or lesions    LABS:                        14.5   9.71  )-----------( 186      ( 23 Oct 2022 05:20 )             42.6     10-    134<L>  |  100  |  16  ----------------------------<  114<H>  4.6   |  22  |  0.78    Ca    9.6      23 Oct 2022 05:20  Phos  3.8     10-23  Mg     2.10     10-23          CAPILLARY BLOOD GLUCOSE          Creatine Kinase, Serum: 81 U/L (10-23 @ 05:20)    CARDIAC MARKERS ( 23 Oct 2022 05:20 )  x     / x     / 81 U/L / x     / x      CARDIAC MARKERS ( 22 Oct 2022 05:40 )  x     / x     / 98 U/L / x     / x                RADIOLOGY & ADDITIONAL TESTS:  CXR:        Care Discussed with Consultants/Other Providers [ x] YES  [ ] NO

## 2022-10-23 NOTE — DISCHARGE NOTE NURSING/CASE MANAGEMENT/SOCIAL WORK - NSDCPEEMAIL_GEN_ALL_CORE
[facility] for Tobacco Control email [email or web address]

## 2022-10-23 NOTE — DISCHARGE NOTE NURSING/CASE MANAGEMENT/SOCIAL WORK - NSDCPEWEB_GEN_ALL_CORE
[facility] for Tobacco Control website --- [email or web address] website --- [email or web address]

## 2022-10-23 NOTE — PROGRESS NOTE ADULT - ATTENDING COMMENTS
62 year old man who is an active smoker who presented with chest pain and diaphoresis with AWSTEMI. SP PCI to LAD and then staged RCA. IABP placed. Required for levophed for BP support. Levo weaned off yesterday and doing well. Heparin on hold    TTE 10/17/22:  CONCLUSIONS:  1. Mitral annular calcification, otherwise normal mitral  valve. Minimal mitral regurgitation.  2. Normal left ventricular internal dimensions and wall  thicknesses.  3. Severe segmental left ventricular systolic dysfunction.  Hypokinesis of the apex, mid to distal septum, and mid to  distal anterior wall.  No LV thrombus seen.  4. Mild diastolic dysfunction (Stage I).  5. Normal right ventricular size and function.  *** No previous Echo exam.    Meds:  ASA  Brilinta  Atorvastatin    #Neuro- No active issues  #Pulm- No active issues  #CV- AWMI with PCI to LAD and staged RCA  Continue ASA/Brilinta  Continue Statin  Pull IABP today  #Renal- no active issues  #ID- No active issues
62 year old man who is an active smoker who presented with chest pain and diaphoresis with AWSTEMI. SP PCI to LAD and then staged RCA. IABP placed. Required for levophed for BP support. This am levo weaned off.    TTE 10/17/22:  CONCLUSIONS:  1. Mitral annular calcification, otherwise normal mitral  valve. Minimal mitral regurgitation.  2. Normal left ventricular internal dimensions and wall  thicknesses.  3. Severe segmental left ventricular systolic dysfunction.  Hypokinesis of the apex, mid to distal septum, and mid to  distal anterior wall.  No LV thrombus seen.  4. Mild diastolic dysfunction (Stage I).  5. Normal right ventricular size and function.  *** No previous Echo exam.    Meds:  ASA  Brilinta  Atorvastatin  Heparin gtt    #Neuro- No active issues  #Pulm- No active issues  #CV- AWMI with PCI to LAD and staged RCA  Continue ASA/Brilinta  Continue Statin  Continue heparin gtt  Wean IABP to 1:2-->3 today  #Renal- no active issues  #ID- No active issues
Patient seen and examined during CCU morning rounds.  Assessment and plan were reviewed with the CCU team, and as outlined above.
62 year old man who is an active smoker who presented with chest pain and diaphoresis with AWSTEMI. SP PCI to LAD and then staged RCA. IABP placed. Required for levophed for BP support.    TTE 10/17/22:  CONCLUSIONS:  1. Mitral annular calcification, otherwise normal mitral  valve. Minimal mitral regurgitation.  2. Normal left ventricular internal dimensions and wall  thicknesses.  3. Severe segmental left ventricular systolic dysfunction.  Hypokinesis of the apex, mid to distal septum, and mid to  distal anterior wall.  No LV thrombus seen.  4. Mild diastolic dysfunction (Stage I).  5. Normal right ventricular size and function.  *** No previous Echo exam.    Meds:  ASA  Brilinta  Atorvastatin  Levophed gtt at 0.04 mcg/kg/min    #Neuro- No active issues  #Pulm- No active issues  #CV- AWMI with PCI to LAD and staged RCA  Continue ASA/Brilinta  Continue Statin  Continue heparin gtt  Wean levophed today with goal to wean IABP thereafter  #Renal- no active issues  #ID- No active issues

## 2022-10-23 NOTE — DISCHARGE NOTE NURSING/CASE MANAGEMENT/SOCIAL WORK - NSDCPEFALRISK_GEN_ALL_CORE
For information on Fall & Injury Prevention, visit: [email or web address] OR  [email or web address] OR  https://www.cdc.gov/steadi/patient.html

## 2022-10-23 NOTE — PROGRESS NOTE ADULT - ASSESSMENT
63 yo M PMH current smoker presenting with acute onset mid sternal chest pain with B/L UE numbness s/p viagra use, found to hae LUKASZ in V1-6 and II with T wave inversions in III, s/p NORMAN pLAD with planned staging to RCA 95% and Cx 90% 10/17.     NEURO  -No issues  -A/O 3    RESP  -No issues  -Satting well on RA    CV  #STEMI with acute systolic heart failure  -EKG NSR with LUKASZ in V1-6 and II with T wave inversions in III  -S/p cath with significant occlusions in RCA and Cx, 99% occlusion of proximal LAD s/p PCI (DESx1) 10/16 with staged RCA (DESx2) on 10/18   -Medical mngmt/outpatient f/u for Cx lesion  -Pt still with IABP - 10/19 weaned levophed off then weaned IABP 1:1 > 1:2, 1:3. Pt placed back on 1:1 in early morning and heparin gtt was held, IABP removed 10/21   -S/p ASA and brilinta load 10/16. C/w daily DAPT   -Trop (11 at admission) peaked at 7845 and now downtrending; CKMB, peaked at 294 and now downtrending   -Echo 10/17 with severe segmental LV systolic dysfx and hypokinesis of apex, mid-distal septum and anterior wall, unchanged echo 10/19   -Pt with AIVR which have resolved s/p Mg   -Started losartan 12.5 10/21    #Hypotension likely in the setting of cardiogenic shock   -Started on levophed gtt 10/18 PM for augmented BPs in 90-100s.Weaned off 10/19  -Augmented pressures stable off levophed and with IABP 1:1, 1:2, 1:3, IABP removed 10/21     #CAD  -Atorvastatin 80 mg daily     GI  -No issues  -DASH diet    /Renal  -No issues    ENDO  -A1c 5.7   -TSH 16.51, free T4 0.9  -Pt with known hypothyroidism and 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

## 2022-10-25 DIAGNOSIS — I51.9 HEART DISEASE, UNSPECIFIED: ICD-10-CM

## 2022-10-25 DIAGNOSIS — Z95.5 PRESENCE OF CORONARY ANGIOPLASTY IMPLANT AND GRAFT: ICD-10-CM

## 2022-10-25 RX ORDER — ASPIRIN ENTERIC COATED TABLETS 81 MG 81 MG/1
81 TABLET, DELAYED RELEASE ORAL DAILY
Refills: 0 | Status: ACTIVE | COMMUNITY

## 2022-11-02 ENCOUNTER — APPOINTMENT (OUTPATIENT)
Dept: CARDIOLOGY | Facility: CLINIC | Age: 62
End: 2022-11-02

## 2022-11-02 ENCOUNTER — NON-APPOINTMENT (OUTPATIENT)
Age: 62
End: 2022-11-02

## 2022-11-02 VITALS
OXYGEN SATURATION: 97 % | WEIGHT: 225 LBS | SYSTOLIC BLOOD PRESSURE: 113 MMHG | HEIGHT: 74 IN | HEART RATE: 81 BPM | DIASTOLIC BLOOD PRESSURE: 72 MMHG | BODY MASS INDEX: 28.88 KG/M2

## 2022-11-02 DIAGNOSIS — I25.2 OLD MYOCARDIAL INFARCTION: ICD-10-CM

## 2022-11-02 DIAGNOSIS — F17.210 NICOTINE DEPENDENCE, CIGARETTES, UNCOMPLICATED: ICD-10-CM

## 2022-11-02 PROCEDURE — 99214 OFFICE O/P EST MOD 30 MIN: CPT | Mod: 25

## 2022-11-02 PROCEDURE — 93000 ELECTROCARDIOGRAM COMPLETE: CPT

## 2022-11-03 PROBLEM — F17.210 SMOKING GREATER THAN 30 PACK YEARS: Status: ACTIVE | Noted: 2021-12-06

## 2022-11-05 PROBLEM — F17.210 HEAVY SMOKER (MORE THAN 20 CIGARETTES PER DAY): Status: RESOLVED | Noted: 2021-12-06 | Resolved: 2022-11-03

## 2022-11-05 NOTE — CARDIOLOGY SUMMARY
[de-identified] : \par 11/02/22 - normal sinus rhythm, right axis, old anterior infarct, T-wave abnormality, consider anterolateral ischemia\par  [de-identified] : \par 10/19/22 - MAC, normal LA, severe segmental LV systolic dysfunction, no obvious LV thrombus, mild diastolic dysfunction, grossly normal RV systolic function, LVEF 20-25%\par  [de-identified] : \par 10/18/22 (PCI) - SRI FRONTIER stents to mRCA 90% and dRCA 60%\par 10/18/22 (CATH) - dLM 20%, patent pLAD stent, mLAD 60%, pCx 75%, pOM1 40%, mOM1 40%, pRCA 30%, mRCA 90%, dRCA 60%\par 10/16/22 (PCI) - SRI FRONTIER stent to pLAD 99%\par 10/16/22 (CATH) - pLAD 99%, pCx 70%, mRCA 70%

## 2022-11-05 NOTE — HISTORY OF PRESENT ILLNESS
[FreeTextEntry1] : Patient recently admitted to [facility] with anterior STEMI. Since discharge he has been doing okay. Denies chest pain, shortness of breath or palpitations. Has been doing some walking without issues.

## 2022-11-05 NOTE — DISCUSSION/SUMMARY
[Coronary Artery Disease] : coronary artery disease [Systolic Heart Failure] : systolic heart failure [Stable] : stable [Compensated] : compensated [FreeTextEntry1] : \par Currently stable from a cardiovascular standpoint. Normotensive. Systolic heart failure secondary to ischemic cardiomyopathy (s/p STEMI -> LVEF 20-25%) Currently euvolemic. Stable CAD (s/p prox LAD and mid/distal RCA stents). No ischemic or CHF symptoms. At this time, patient is considered ACC/AHA CHF stage B. Continue current medications including aspirin and clopidogrel. ECG completed today and reviewed (findings as noted above). Recent hospital test records reviewed. Follow up in 4-6 weeks. Patient may return to work with restrictions. Should avoid lifting more than 25-30 lbs at this time. [EKG obtained to assist in diagnosis and management of assessed problem(s)] : EKG obtained to assist in diagnosis and management of assessed problem(s)

## 2022-12-21 ENCOUNTER — NON-APPOINTMENT (OUTPATIENT)
Age: 62
End: 2022-12-21

## 2022-12-21 ENCOUNTER — APPOINTMENT (OUTPATIENT)
Dept: CARDIOLOGY | Facility: CLINIC | Age: 62
End: 2022-12-21

## 2022-12-21 VITALS
HEIGHT: 62 IN | WEIGHT: 225 LBS | DIASTOLIC BLOOD PRESSURE: 75 MMHG | BODY MASS INDEX: 41.41 KG/M2 | OXYGEN SATURATION: 97 % | HEART RATE: 71 BPM | SYSTOLIC BLOOD PRESSURE: 113 MMHG

## 2022-12-21 PROCEDURE — 99214 OFFICE O/P EST MOD 30 MIN: CPT | Mod: 25

## 2022-12-21 PROCEDURE — 93000 ELECTROCARDIOGRAM COMPLETE: CPT

## 2022-12-25 NOTE — DISCUSSION/SUMMARY
[Coronary Artery Disease] : coronary artery disease [Systolic Heart Failure] : systolic heart failure [Stable] : stable [Compensated] : compensated [FreeTextEntry1] : \par Currently stable from a cardiovascular standpoint. Normotensive. Systolic heart failure secondary to ischemic cardiomyopathy (s/p STEMI -> LVEF 20-25%) Currently appears euvolemic. Stable CAD (s/p prox LAD and mid/distal RCA stents). No ischemic or CHF symptoms. At this time, patient is considered ACC/AHA CHF stage B. Continue current medications including aspirin and clopidogrel. ECG completed today and reviewed (findings as noted above). Patient to have fasting labs checked in upcoming weeks. Follow up in 2 months. Discussed and recommended cardiac rehab given symptoms of fatigue. Will schedule an echocardiogram to reassess his cardiac structures and function. [EKG obtained to assist in diagnosis and management of assessed problem(s)] : EKG obtained to assist in diagnosis and management of assessed problem(s)

## 2022-12-25 NOTE — CARDIOLOGY SUMMARY
[de-identified] : \par 12/21/22 - normal sinus rhythm, right axis, old anterior infarct, T-wave abnormality, consider anterolateral ischemia\par  [de-identified] : \par 10/19/22 - MAC, normal LA, severe segmental LV systolic dysfunction, no obvious LV thrombus, mild diastolic dysfunction, grossly normal RV systolic function, LVEF 20-25%\par  [de-identified] : \par 10/18/22 (PCI) - SRI FRONTIER stents to mRCA 90% and dRCA 60%\par 10/18/22 (CATH) - dLM 20%, patent pLAD stent, mLAD 60%, pCx 75%, pOM1 40%, mOM1 40%, pRCA 30%, mRCA 90%, dRCA 60%\par 10/16/22 (PCI) - SRI FRONTIER stent to pLAD 99%\par 10/16/22 (CATH) - pLAD 99%, pCx 70%, mRCA 70%

## 2023-01-11 ENCOUNTER — APPOINTMENT (OUTPATIENT)
Dept: INTERNAL MEDICINE | Facility: CLINIC | Age: 63
End: 2023-01-11
Payer: COMMERCIAL

## 2023-01-11 VITALS
DIASTOLIC BLOOD PRESSURE: 70 MMHG | HEART RATE: 82 BPM | OXYGEN SATURATION: 97 % | WEIGHT: 230 LBS | SYSTOLIC BLOOD PRESSURE: 117 MMHG | TEMPERATURE: 97.5 F | BODY MASS INDEX: 42.33 KG/M2 | RESPIRATION RATE: 12 BRPM | HEIGHT: 62 IN

## 2023-01-11 PROCEDURE — 99396 PREV VISIT EST AGE 40-64: CPT

## 2023-01-11 NOTE — PHYSICAL EXAM
[No Acute Distress] : no acute distress [Well Nourished] : well nourished [Well-Appearing] : well-appearing [Normal Sclera/Conjunctiva] : normal sclera/conjunctiva [EOMI] : extraocular movements intact [Normal Outer Ear/Nose] : the outer ears and nose were normal in appearance [No JVD] : no jugular venous distention [No Lymphadenopathy] : no lymphadenopathy [Supple] : supple [Thyroid Normal, No Nodules] : the thyroid was normal and there were no nodules present [No Respiratory Distress] : no respiratory distress  [No Accessory Muscle Use] : no accessory muscle use [Clear to Auscultation] : lungs were clear to auscultation bilaterally [Normal Rate] : normal rate  [Regular Rhythm] : with a regular rhythm [Normal S1, S2] : normal S1 and S2 [No Murmur] : no murmur heard [No Abdominal Bruit] : a ~M bruit was not heard ~T in the abdomen [Pedal Pulses Present] : the pedal pulses are present [No Edema] : there was no peripheral edema [No Palpable Aorta] : no palpable aorta [No Extremity Clubbing/Cyanosis] : no extremity clubbing/cyanosis [Soft] : abdomen soft [Non Tender] : non-tender [Non-distended] : non-distended [Normal Bowel Sounds] : normal bowel sounds [Normal Posterior Cervical Nodes] : no posterior cervical lymphadenopathy [Normal Anterior Cervical Nodes] : no anterior cervical lymphadenopathy [No CVA Tenderness] : no CVA  tenderness [No Spinal Tenderness] : no spinal tenderness [No Joint Swelling] : no joint swelling [Grossly Normal Strength/Tone] : grossly normal strength/tone [No Rash] : no rash [Coordination Grossly Intact] : coordination grossly intact [No Focal Deficits] : no focal deficits [Normal Gait] : normal gait [Normal Affect] : the affect was normal [Alert and Oriented x3] : oriented to person, place, and time [Normal Insight/Judgement] : insight and judgment were intact

## 2023-01-18 LAB
25(OH)D3 SERPL-MCNC: 36.2 NG/ML
ALBUMIN SERPL ELPH-MCNC: 4.6 G/DL
ALP BLD-CCNC: 70 U/L
ALT SERPL-CCNC: 36 U/L
ANION GAP SERPL CALC-SCNC: 13 MMOL/L
APPEARANCE: CLEAR
AST SERPL-CCNC: 25 U/L
BASOPHILS # BLD AUTO: 0.08 K/UL
BASOPHILS NFR BLD AUTO: 0.9 %
BILIRUB SERPL-MCNC: 0.4 MG/DL
BILIRUBIN URINE: NEGATIVE
BLOOD URINE: NEGATIVE
BUN SERPL-MCNC: 18 MG/DL
CALCIUM SERPL-MCNC: 9.9 MG/DL
CHLORIDE SERPL-SCNC: 100 MMOL/L
CHOLEST SERPL-MCNC: 146 MG/DL
CO2 SERPL-SCNC: 27 MMOL/L
COLOR: YELLOW
CREAT SERPL-MCNC: 0.99 MG/DL
EGFR: 87 ML/MIN/1.73M2
EOSINOPHIL # BLD AUTO: 0.28 K/UL
EOSINOPHIL NFR BLD AUTO: 3.1 %
ESTIMATED AVERAGE GLUCOSE: 128 MG/DL
GLUCOSE QUALITATIVE U: NEGATIVE
GLUCOSE SERPL-MCNC: 109 MG/DL
HBA1C MFR BLD HPLC: 6.1 %
HCT VFR BLD CALC: 43.7 %
HDLC SERPL-MCNC: 32 MG/DL
HGB BLD-MCNC: 14.8 G/DL
IMM GRANULOCYTES NFR BLD AUTO: 0.4 %
KETONES URINE: NEGATIVE
LDLC SERPL CALC-MCNC: 76 MG/DL
LEUKOCYTE ESTERASE URINE: NEGATIVE
LYMPHOCYTES # BLD AUTO: 2.23 K/UL
LYMPHOCYTES NFR BLD AUTO: 24.7 %
MAN DIFF?: NORMAL
MCHC RBC-ENTMCNC: 32.4 PG
MCHC RBC-ENTMCNC: 33.9 GM/DL
MCV RBC AUTO: 95.6 FL
MONOCYTES # BLD AUTO: 0.65 K/UL
MONOCYTES NFR BLD AUTO: 7.2 %
NEUTROPHILS # BLD AUTO: 5.76 K/UL
NEUTROPHILS NFR BLD AUTO: 63.7 %
NITRITE URINE: NEGATIVE
NONHDLC SERPL-MCNC: 114 MG/DL
PH URINE: 6
PLATELET # BLD AUTO: 209 K/UL
POTASSIUM SERPL-SCNC: 4.1 MMOL/L
PROT SERPL-MCNC: 7.1 G/DL
PROTEIN URINE: NORMAL
PSA SERPL-MCNC: 0.81 NG/ML
RBC # BLD: 4.57 M/UL
RBC # FLD: 12.8 %
SODIUM SERPL-SCNC: 140 MMOL/L
SPECIFIC GRAVITY URINE: 1.03
TRIGL SERPL-MCNC: 189 MG/DL
TSH SERPL-ACNC: 3.78 UIU/ML
UROBILINOGEN URINE: NORMAL
VIT B12 SERPL-MCNC: 1378 PG/ML
WBC # FLD AUTO: 9.04 K/UL

## 2023-01-29 NOTE — HEALTH RISK ASSESSMENT
[Current] : Current [Yes] : Yes [0] : 2) Feeling down, depressed, or hopeless: Not at all (0) [Employed] : employed [] :  [# Of Children ___] : has [unfilled] children [Sexually Active] : sexually active [de-identified] : 1-2 cig/day [de-identified] : socially [ColonoscopyDate] : 2019 [de-identified] : with wife

## 2023-01-29 NOTE — HISTORY OF PRESENT ILLNESS
[de-identified] : Mr. SYLVESTER DUBON is a 62year old male, with history of hypothyroid,  presents for annual physical\par \par He is s/p anterior STEMI Oct 2022. Admitted to [facility] s/p stent X 3 \par Following with cardiology Reports compliance with taking his meds daily\par He has greatly reduced his smoking since having the MI to 1-2 cig/day\par Denies SOB, chest pain, abdominal pain, N/V/D, leg swelling, headache, dizziness \par \par

## 2023-01-29 NOTE — ASSESSMENT
[FreeTextEntry1] : \par Physical\par He is UTD with his colonoscopy\par \par s/p anterior STEMI Oct 2022. s/p stent placement X 3 \par cont Atorvastatin 80mg daily\par cont Zetia 10mg daily\par cont Plavix 75mg daily\par cont ASA 81mg daily\par cont Losartan 25mg  1/2 tab daily\par Following with cardiology \par \par Hx of hypothyroid\par cont Levothyroxine 75mcg daily\par we'll check TSH/T4 today\par \par blood work ordered\par \par follow up in one week for lab results\par

## 2023-01-29 NOTE — COUNSELING
[Benefits of weight loss discussed] : Benefits of weight loss discussed [Encouraged to increase physical activity] : Encouraged to increase physical activity [Yes] : Willing to quit smoking

## 2023-02-13 ENCOUNTER — OUTPATIENT (OUTPATIENT)
Dept: OUTPATIENT SERVICES | Facility: HOSPITAL | Age: 63
LOS: 1 days | End: 2023-02-13

## 2023-02-13 ENCOUNTER — APPOINTMENT (OUTPATIENT)
Dept: CV DIAGNOSITCS | Facility: HOSPITAL | Age: 63
End: 2023-02-13
Payer: COMMERCIAL

## 2023-02-13 DIAGNOSIS — I50.22 CHRONIC SYSTOLIC (CONGESTIVE) HEART FAILURE: ICD-10-CM

## 2023-02-13 DIAGNOSIS — I25.10 ATHEROSCLEROTIC HEART DISEASE OF NATIVE CORONARY ARTERY WITHOUT ANGINA PECTORIS: ICD-10-CM

## 2023-02-13 PROCEDURE — 93306 TTE W/DOPPLER COMPLETE: CPT | Mod: 26

## 2023-03-30 ENCOUNTER — LABORATORY RESULT (OUTPATIENT)
Age: 63
End: 2023-03-30

## 2023-04-13 ENCOUNTER — NON-APPOINTMENT (OUTPATIENT)
Age: 63
End: 2023-04-13

## 2023-04-13 ENCOUNTER — APPOINTMENT (OUTPATIENT)
Dept: CARDIOLOGY | Facility: CLINIC | Age: 63
End: 2023-04-13
Payer: COMMERCIAL

## 2023-04-13 VITALS
WEIGHT: 232 LBS | HEIGHT: 62 IN | HEART RATE: 79 BPM | OXYGEN SATURATION: 94 % | BODY MASS INDEX: 42.69 KG/M2 | SYSTOLIC BLOOD PRESSURE: 113 MMHG | DIASTOLIC BLOOD PRESSURE: 77 MMHG

## 2023-04-13 PROCEDURE — 99214 OFFICE O/P EST MOD 30 MIN: CPT | Mod: 25

## 2023-04-13 PROCEDURE — 93000 ELECTROCARDIOGRAM COMPLETE: CPT

## 2023-04-14 LAB
ALBUMIN SERPL ELPH-MCNC: 4.6 G/DL
ALP BLD-CCNC: 74 U/L
ALT SERPL-CCNC: 35 U/L
ANION GAP SERPL CALC-SCNC: 14 MMOL/L
AST SERPL-CCNC: 28 U/L
BILIRUB SERPL-MCNC: 0.4 MG/DL
BUN SERPL-MCNC: 9 MG/DL
CALCIUM SERPL-MCNC: 9.8 MG/DL
CHLORIDE SERPL-SCNC: 104 MMOL/L
CHOLEST SERPL-MCNC: 134 MG/DL
CO2 SERPL-SCNC: 24 MMOL/L
CREAT SERPL-MCNC: 0.85 MG/DL
EGFR: 99 ML/MIN/1.73M2
ESTIMATED AVERAGE GLUCOSE: 126 MG/DL
GLUCOSE SERPL-MCNC: 94 MG/DL
HBA1C MFR BLD HPLC: 6 %
HDLC SERPL-MCNC: 31 MG/DL
LDLC SERPL CALC-MCNC: 63 MG/DL
NONHDLC SERPL-MCNC: 103 MG/DL
POTASSIUM SERPL-SCNC: 4.2 MMOL/L
PROT SERPL-MCNC: 6.8 G/DL
SODIUM SERPL-SCNC: 142 MMOL/L
TRIGL SERPL-MCNC: 197 MG/DL
TSH SERPL-ACNC: 8.95 UIU/ML

## 2023-04-14 NOTE — CARDIOLOGY SUMMARY
[de-identified] : \par 04/13/23 - normal sinus rhythm, old anterior infarct\par  [de-identified] : \par 02/13/23 - calcified AV with normal opening, normal LA, moderate segmental LV systolic dysfunction, normal RV size and function, LVEF 41%\par 10/19/22 - MAC, normal LA, severe segmental LV systolic dysfunction, no obvious LV thrombus, mild diastolic dysfunction, grossly normal RV systolic function, LVEF 20-25%\par  [de-identified] : \par 10/18/22 (PCI) - SRI FRONTIER stents to mRCA 90% and dRCA 60%\par 10/18/22 (CATH) - dLM 20%, patent pLAD stent, mLAD 60%, pCx 75%, pOM1 40%, mOM1 40%, pRCA 30%, mRCA 90%, dRCA 60%\par 10/16/22 (PCI) - SRI FRONTIER stent to pLAD 99%\par 10/16/22 (CATH) - pLAD 99%, pCx 70%, mRCA 70%

## 2023-04-14 NOTE — HISTORY OF PRESENT ILLNESS
[FreeTextEntry1] : Currently doing okay. Denies chest pain, shortness of breath or palpitations. Gets tired at the end of the day.

## 2023-04-14 NOTE — DISCUSSION/SUMMARY
[Coronary Artery Disease] : coronary artery disease [Systolic Heart Failure] : systolic heart failure [Stable] : stable [Compensated] : compensated [EKG obtained to assist in diagnosis and management of assessed problem(s)] : EKG obtained to assist in diagnosis and management of assessed problem(s) [FreeTextEntry1] : \par Currently stable from a cardiovascular standpoint. Normotensive. Systolic heart failure secondary to ischemic cardiomyopathy with interval improvement in LV systolic function (s/p STEMI -> LVEF 20-25% -> 41%) Currently appears euvolemic. Stable CAD (s/p prox LAD and mid/distal RCA stents). No ischemic or CHF symptoms. At this time, patient is considered ACC/AHA CHF stage B. Daytime fatigue possibly due to physical conditioning and possibly underlying sleep apnea (snores). Continue current medications including aspirin, clopidogrel, and losartan. ECG completed today and reviewed (findings as noted above). Most recent echo results reviewed. Most recent labs reviewed. LDL optimized. Triglyceride elevated but patient did not fast. Consider sleep testing. Follow up in 4 months.

## 2023-08-09 ENCOUNTER — APPOINTMENT (OUTPATIENT)
Dept: CARDIOLOGY | Facility: CLINIC | Age: 63
End: 2023-08-09
Payer: COMMERCIAL

## 2023-08-09 ENCOUNTER — NON-APPOINTMENT (OUTPATIENT)
Age: 63
End: 2023-08-09

## 2023-08-09 VITALS
WEIGHT: 232 LBS | BODY MASS INDEX: 37.28 KG/M2 | HEART RATE: 76 BPM | DIASTOLIC BLOOD PRESSURE: 68 MMHG | OXYGEN SATURATION: 95 % | SYSTOLIC BLOOD PRESSURE: 103 MMHG | HEIGHT: 66 IN

## 2023-08-09 PROCEDURE — 99214 OFFICE O/P EST MOD 30 MIN: CPT | Mod: 25

## 2023-08-09 PROCEDURE — 93000 ELECTROCARDIOGRAM COMPLETE: CPT

## 2023-08-09 NOTE — DISCUSSION/SUMMARY
[Coronary Artery Disease] : coronary artery disease [Systolic Heart Failure] : systolic heart failure [Stable] : stable [Compensated] : compensated [FreeTextEntry1] : Currently stable from a cardiovascular standpoint. Normotensive. Systolic heart failure secondary to ischemic cardiomyopathy with interval improvement in LV systolic function (s/p STEMI -> LVEF 20-25% -> 41%) Currently appears euvolemic. Stable CAD (s/p prox LAD and mid/distal RCA stents). No ischemic or CHF symptoms. At this time, patient is considered ACC/AHA CHF stage B. Daytime fatigue possibly due to physical conditioning and possibly underlying sleep apnea (snores). Advised patient to take furosemide earlier in the day as he currently takes it when he gets home from work. Continue current medications including aspirin, clopidogrel, and losartan. ECG completed today and reviewed (findings as noted above). Follow up in 3-4 months. [EKG obtained to assist in diagnosis and management of assessed problem(s)] : EKG obtained to assist in diagnosis and management of assessed problem(s)

## 2023-08-09 NOTE — HISTORY OF PRESENT ILLNESS
[FreeTextEntry1] : Feels tired especially mid-day. Denies chest pain, shortness of breath or palpitations. Continues to work full time without issues. Has been smoking on and off.

## 2023-08-09 NOTE — CARDIOLOGY SUMMARY
[de-identified] : 08/09/23 - normal sinus rhythm, old anterior infarct, nonspecific ST abnormality  [de-identified] : \par  02/13/23 - calcified AV with normal opening, normal LA, moderate segmental LV systolic dysfunction, normal RV size and function, LVEF 41%\par  10/19/22 - MAC, normal LA, severe segmental LV systolic dysfunction, no obvious LV thrombus, mild diastolic dysfunction, grossly normal RV systolic function, LVEF 20-25%\par   [de-identified] : \par  10/18/22 (PCI) - SRI FRONTIER stents to mRCA 90% and dRCA 60%\par  10/18/22 (CATH) - dLM 20%, patent pLAD stent, mLAD 60%, pCx 75%, pOM1 40%, mOM1 40%, pRCA 30%, mRCA 90%, dRCA 60%\par  10/16/22 (PCI) - SRI FRONTIER stent to pLAD 99%\par  10/16/22 (CATH) - pLAD 99%, pCx 70%, mRCA 70%

## 2023-09-26 NOTE — ED ADULT TRIAGE NOTE - NS ED NURSE AMBULANCES
[facility]
Price (Do Not Change): 0.00
Instructions: This plan will send the code FBSD to the PM system.  DO NOT or CHANGE the price.
Detail Level: Simple

## 2023-09-28 RX ORDER — LOSARTAN POTASSIUM 25 MG/1
25 TABLET, FILM COATED ORAL DAILY
Qty: 45 | Refills: 3 | Status: ACTIVE | COMMUNITY
Start: 1900-01-01 | End: 1900-01-01

## 2023-09-28 RX ORDER — EZETIMIBE 10 MG/1
10 TABLET ORAL DAILY
Qty: 90 | Refills: 3 | Status: ACTIVE | COMMUNITY
Start: 2021-12-14 | End: 1900-01-01

## 2023-09-28 RX ORDER — ATORVASTATIN CALCIUM 80 MG/1
80 TABLET, FILM COATED ORAL DAILY
Qty: 90 | Refills: 3 | Status: ACTIVE | COMMUNITY
Start: 1900-01-01 | End: 1900-01-01

## 2023-10-10 DIAGNOSIS — J06.9 ACUTE UPPER RESPIRATORY INFECTION, UNSPECIFIED: ICD-10-CM

## 2024-01-24 ENCOUNTER — APPOINTMENT (OUTPATIENT)
Dept: CARDIOLOGY | Facility: CLINIC | Age: 64
End: 2024-01-24
Payer: COMMERCIAL

## 2024-01-24 ENCOUNTER — NON-APPOINTMENT (OUTPATIENT)
Age: 64
End: 2024-01-24

## 2024-01-24 VITALS
HEART RATE: 62 BPM | DIASTOLIC BLOOD PRESSURE: 68 MMHG | OXYGEN SATURATION: 96 % | HEIGHT: 66 IN | BODY MASS INDEX: 37.28 KG/M2 | WEIGHT: 232 LBS | SYSTOLIC BLOOD PRESSURE: 104 MMHG

## 2024-01-24 DIAGNOSIS — I25.10 ATHEROSCLEROTIC HEART DISEASE OF NATIVE CORONARY ARTERY W/OUT ANGINA PECTORIS: ICD-10-CM

## 2024-01-24 DIAGNOSIS — I50.22 CHRONIC SYSTOLIC (CONGESTIVE) HEART FAILURE: ICD-10-CM

## 2024-01-24 DIAGNOSIS — I25.5 ISCHEMIC CARDIOMYOPATHY: ICD-10-CM

## 2024-01-24 PROCEDURE — 93000 ELECTROCARDIOGRAM COMPLETE: CPT

## 2024-01-24 PROCEDURE — 99214 OFFICE O/P EST MOD 30 MIN: CPT | Mod: 25

## 2024-01-24 RX ORDER — NICOTINE 14MG/24HR
14 PATCH, TRANSDERMAL 24 HOURS TRANSDERMAL
Refills: 0 | Status: DISCONTINUED | COMMUNITY
End: 2024-01-24

## 2024-01-24 RX ORDER — AZITHROMYCIN 250 MG/1
250 TABLET, FILM COATED ORAL
Qty: 1 | Refills: 0 | Status: DISCONTINUED | COMMUNITY
Start: 2023-10-10 | End: 2024-01-24

## 2024-01-24 NOTE — HISTORY OF PRESENT ILLNESS
[FreeTextEntry1] : Currently doing well. Denies chest pain, shortness of breath or palpitations. Admits to smoking a bit more than a pack a day.

## 2024-01-24 NOTE — DISCUSSION/SUMMARY
[Coronary Artery Disease] : coronary artery disease [Systolic Heart Failure] : systolic heart failure [Stable] : stable [Compensated] : compensated [EKG obtained to assist in diagnosis and management of assessed problem(s)] : EKG obtained to assist in diagnosis and management of assessed problem(s) [FreeTextEntry1] : Currently stable from a cardiovascular standpoint. Normotensive. Systolic heart failure secondary to ischemic cardiomyopathy with interval improvement in LV systolic function (s/p STEMI -> LVEF 20-25% -> 41%) Currently appears euvolemic. Stable CAD (s/p prox LAD and mid/distal RCA stents). No ischemic or CHF symptoms. At this time, patient is considered ACC/AHA CHF stage B. Patient may discontinue furosemide at this time and monitor daily weight. Continue all other current medications including aspirin, clopidogrel, and losartan. ECG completed today and reviewed (findings as noted above). Will schedule an echocardiogram to reassess his cardiac structures and function. Pending the echo results, I will make further recommendations. Smoking cessation and low salt diet advised. Follow up in 4 months.

## 2024-01-24 NOTE — CARDIOLOGY SUMMARY
[de-identified] : 02/13/23 - calcified AV with normal opening, normal LA, moderate segmental LV systolic dysfunction, normal RV size and function, LVEF 41% 10/19/22 - MAC, normal LA, severe segmental LV systolic dysfunction, no obvious LV thrombus, mild diastolic dysfunction, grossly normal RV systolic function, LVEF 20-25% [de-identified] : 01/24/24 - normal sinus rhythm, poor R-wave progression, nonspecific T-wave abnormality [de-identified] : \par  10/18/22 (PCI) - SRI FRONTIER stents to mRCA 90% and dRCA 60%\par  10/18/22 (CATH) - dLM 20%, patent pLAD stent, mLAD 60%, pCx 75%, pOM1 40%, mOM1 40%, pRCA 30%, mRCA 90%, dRCA 60%\par  10/16/22 (PCI) - SRI FRONTIER stent to pLAD 99%\par  10/16/22 (CATH) - pLAD 99%, pCx 70%, mRCA 70%

## 2024-01-24 NOTE — PHYSICAL EXAM
[Well Developed] : well developed [Well Nourished] : well nourished [No Acute Distress] : no acute distress [Normal Conjunctiva] : normal conjunctiva [Normal Venous Pressure] : normal venous pressure [No Carotid Bruit] : no carotid bruit [Normal S1, S2] : normal S1, S2 [No Murmur] : no murmur [No Gallop] : no gallop [No Rub] : no rub [Good Air Entry] : good air entry [Clear Lung Fields] : clear lung fields [Soft] : abdomen soft [No Respiratory Distress] : no respiratory distress  [Normal Gait] : normal gait [Non Tender] : non-tender [No Edema] : no edema [No Cyanosis] : no cyanosis [No Rash] : no rash [No Skin Lesions] : no skin lesions [Moves all extremities] : moves all extremities [No Focal Deficits] : no focal deficits [Normal Speech] : normal speech [Alert and Oriented] : alert and oriented

## 2024-02-16 RX ORDER — CLOPIDOGREL BISULFATE 75 MG/1
75 TABLET, FILM COATED ORAL DAILY
Qty: 90 | Refills: 3 | Status: ACTIVE | COMMUNITY
Start: 1900-01-01 | End: 1900-01-01

## 2024-03-04 ENCOUNTER — OUTPATIENT (OUTPATIENT)
Dept: OUTPATIENT SERVICES | Facility: HOSPITAL | Age: 64
LOS: 1 days | End: 2024-03-04
Payer: COMMERCIAL

## 2024-03-04 ENCOUNTER — APPOINTMENT (OUTPATIENT)
Dept: CV DIAGNOSITCS | Facility: HOSPITAL | Age: 64
End: 2024-03-04

## 2024-03-04 ENCOUNTER — RESULT REVIEW (OUTPATIENT)
Age: 64
End: 2024-03-04

## 2024-03-04 DIAGNOSIS — I50.22 CHRONIC SYSTOLIC (CONGESTIVE) HEART FAILURE: ICD-10-CM

## 2024-03-04 PROCEDURE — 93306 TTE W/DOPPLER COMPLETE: CPT | Mod: 26

## 2024-03-10 ENCOUNTER — RESULT REVIEW (OUTPATIENT)
Age: 64
End: 2024-03-10

## 2024-03-11 ENCOUNTER — APPOINTMENT (OUTPATIENT)
Dept: CV DIAGNOSTICS | Facility: HOSPITAL | Age: 64
End: 2024-03-11

## 2024-03-11 ENCOUNTER — OUTPATIENT (OUTPATIENT)
Dept: OUTPATIENT SERVICES | Facility: HOSPITAL | Age: 64
LOS: 1 days | End: 2024-03-11
Payer: COMMERCIAL

## 2024-03-11 DIAGNOSIS — I25.10 ATHEROSCLEROTIC HEART DISEASE OF NATIVE CORONARY ARTERY WITHOUT ANGINA PECTORIS: ICD-10-CM

## 2024-03-11 PROCEDURE — 93016 CV STRESS TEST SUPVJ ONLY: CPT | Mod: GC

## 2024-03-11 PROCEDURE — 93018 CV STRESS TEST I&R ONLY: CPT | Mod: GC

## 2024-05-08 ENCOUNTER — APPOINTMENT (OUTPATIENT)
Dept: INTERNAL MEDICINE | Facility: CLINIC | Age: 64
End: 2024-05-08
Payer: COMMERCIAL

## 2024-05-08 ENCOUNTER — LABORATORY RESULT (OUTPATIENT)
Age: 64
End: 2024-05-08

## 2024-05-08 VITALS
HEIGHT: 66 IN | SYSTOLIC BLOOD PRESSURE: 106 MMHG | BODY MASS INDEX: 36.64 KG/M2 | HEART RATE: 83 BPM | DIASTOLIC BLOOD PRESSURE: 68 MMHG | WEIGHT: 228 LBS | TEMPERATURE: 97.6 F | OXYGEN SATURATION: 95 % | RESPIRATION RATE: 12 BRPM

## 2024-05-08 DIAGNOSIS — F17.200 NICOTINE DEPENDENCE, UNSPECIFIED, UNCOMPLICATED: ICD-10-CM

## 2024-05-08 DIAGNOSIS — Z00.00 ENCOUNTER FOR GENERAL ADULT MEDICAL EXAMINATION W/OUT ABNORMAL FINDINGS: ICD-10-CM

## 2024-05-08 PROCEDURE — 99406 BEHAV CHNG SMOKING 3-10 MIN: CPT | Mod: 25

## 2024-05-08 PROCEDURE — 99396 PREV VISIT EST AGE 40-64: CPT

## 2024-05-08 PROCEDURE — G0444 DEPRESSION SCREEN ANNUAL: CPT | Mod: 59

## 2024-05-08 RX ORDER — FUROSEMIDE 20 MG/1
20 TABLET ORAL DAILY
Qty: 90 | Refills: 3 | Status: DISCONTINUED | COMMUNITY
End: 2024-05-08

## 2024-05-09 LAB
25(OH)D3 SERPL-MCNC: 45.5 NG/ML
ALBUMIN SERPL ELPH-MCNC: 4.5 G/DL
ALP BLD-CCNC: 67 U/L
ALT SERPL-CCNC: 19 U/L
ANION GAP SERPL CALC-SCNC: 16 MMOL/L
APPEARANCE: CLEAR
AST SERPL-CCNC: 15 U/L
BASOPHILS # BLD AUTO: 0.08 K/UL
BASOPHILS NFR BLD AUTO: 0.8 %
BILIRUB SERPL-MCNC: 0.2 MG/DL
BILIRUBIN URINE: NEGATIVE
BLOOD URINE: NEGATIVE
BUN SERPL-MCNC: 16 MG/DL
CALCIUM SERPL-MCNC: 9.7 MG/DL
CHLORIDE SERPL-SCNC: 103 MMOL/L
CHOLEST SERPL-MCNC: 146 MG/DL
CO2 SERPL-SCNC: 21 MMOL/L
COLOR: YELLOW
CREAT SERPL-MCNC: 0.83 MG/DL
EGFR: 99 ML/MIN/1.73M2
EOSINOPHIL # BLD AUTO: 0.22 K/UL
EOSINOPHIL NFR BLD AUTO: 2.2 %
ESTIMATED AVERAGE GLUCOSE: 117 MG/DL
GLUCOSE QUALITATIVE U: NEGATIVE MG/DL
GLUCOSE SERPL-MCNC: 102 MG/DL
HBA1C MFR BLD HPLC: 5.7 %
HCT VFR BLD CALC: 41.9 %
HDLC SERPL-MCNC: 37 MG/DL
HGB BLD-MCNC: 13.6 G/DL
IMM GRANULOCYTES NFR BLD AUTO: 0.5 %
KETONES URINE: NEGATIVE MG/DL
LDLC SERPL CALC-MCNC: 77 MG/DL
LEUKOCYTE ESTERASE URINE: NEGATIVE
LYMPHOCYTES # BLD AUTO: 2.73 K/UL
LYMPHOCYTES NFR BLD AUTO: 27 %
MAN DIFF?: NORMAL
MCHC RBC-ENTMCNC: 31.5 PG
MCHC RBC-ENTMCNC: 32.5 GM/DL
MCV RBC AUTO: 97 FL
MONOCYTES # BLD AUTO: 0.51 K/UL
MONOCYTES NFR BLD AUTO: 5 %
NEUTROPHILS # BLD AUTO: 6.53 K/UL
NEUTROPHILS NFR BLD AUTO: 64.5 %
NITRITE URINE: NEGATIVE
NONHDLC SERPL-MCNC: 110 MG/DL
PH URINE: 6
PLATELET # BLD AUTO: 202 K/UL
POTASSIUM SERPL-SCNC: 4.2 MMOL/L
PROT SERPL-MCNC: 6.6 G/DL
PROTEIN URINE: NEGATIVE MG/DL
PSA SERPL-MCNC: 0.88 NG/ML
RBC # BLD: 4.32 M/UL
RBC # FLD: 13.2 %
SODIUM SERPL-SCNC: 140 MMOL/L
SPECIFIC GRAVITY URINE: 1.02
TRIGL SERPL-MCNC: 197 MG/DL
TSH SERPL-ACNC: 5.15 UIU/ML
UROBILINOGEN URINE: 0.2 MG/DL
VIT B12 SERPL-MCNC: 938 PG/ML
WBC # FLD AUTO: 10.12 K/UL

## 2024-05-09 RX ORDER — LEVOTHYROXINE SODIUM 0.09 MG/1
88 TABLET ORAL DAILY
Qty: 90 | Refills: 1 | Status: ACTIVE | COMMUNITY
Start: 1900-01-01 | End: 1900-01-01

## 2024-05-18 NOTE — ASSESSMENT
[FreeTextEntry1] : Physical  Referred to GI for colonoscopy  Current smoker  smoking cessation discussed with patient. Referred for low dose CT scan for lung Ca screening.  s/p anterior STEMI Oct 2022. s/p stent placement X 3 off HCTZ cont Plavix 75mg daily cont ASA 81mg daily cont Losartan 25mg 1/2 tab daily Following with cardiology  Hypercholesterolemia cont Atorvastatin 80mg daily cont Zetia 10mg daily Reinforced the importance of following a low cholesterol/low fat diet and increased physical activity. We'll check Lipid panel and LFTs today.   Hypothyroid on Levothyroxine 88mcg daily -pt instructed to hold off on taking it until bloodwork results come out We'll check TSH/T4 today.  High risk for DM discussed importance of following a low sugar/low carb diet We'll check glucose and HbA1c today.  Blood work ordered. Follow up in one week for lab results

## 2024-05-18 NOTE — HEALTH RISK ASSESSMENT
[Yes] : Yes [Monthly or less (1 pt)] : Monthly or less (1 point) [1 or 2 (0 pts)] : 1 or 2 (0 points) [Never (0 pts)] : Never (0 points) [Little interest or pleasure doing things] : 1) Little interest or pleasure doing things [Feeling down, depressed, or hopeless] : 2) Feeling down, depressed, or hopeless [0] : 2) Feeling down, depressed, or hopeless: Not at all (0) [PHQ-2 Negative - No further assessment needed] : PHQ-2 Negative - No further assessment needed [None] : None [Employed] : employed [] :  [# Of Children ___] : has [unfilled] children [Sexually Active] : sexually active [Feels Safe at Home] : Feels safe at home [Current] : Current [de-identified] : Socially [[id number]] : 0 [ColonoscopyDate] : 2019 [ColonoscopyComments] : Pt needs to have a colonoscopy every 5 years [de-identified] : with wife [20 or more] : 20 or more [de-identified] : >1-2 cigarettes per day

## 2024-05-18 NOTE — ADDENDUM
[FreeTextEntry1] : I, Katy Berry, am scribing for and in the presence of Dr. Mima Love DO on 05/08/2024.   All medical record entries made by the scribe were at my, Dr. Mima Love, direction and personally dictated by me on 05/08/2024. I have reviewed the chart and agree that the record accurately reflects my personal performance of the history, physical exam, assessment and plan. I have also personally directed, reviewed, and agreed with the chart.

## 2024-05-18 NOTE — HISTORY OF PRESENT ILLNESS
[de-identified] : Mr. SYLVESTER DUBON is a 64 year old male with a Hx of hypothyroid, hypercholesterolemia, s/p anterior STEMI Oct 2022. s/p stent placement X3, high risk for DM, who presents for an annual physical.   Pt states he is feeling well. Reports compliance with taking his meds daily. Denies any SOB, CP, abdominal pain, N/V/D, headache, dizziness, or leg swelling. Offers no complaints.

## 2024-07-17 ENCOUNTER — APPOINTMENT (OUTPATIENT)
Dept: CARDIOLOGY | Facility: CLINIC | Age: 64
End: 2024-07-17
Payer: COMMERCIAL

## 2024-07-17 ENCOUNTER — NON-APPOINTMENT (OUTPATIENT)
Age: 64
End: 2024-07-17

## 2024-07-17 VITALS
HEIGHT: 74 IN | SYSTOLIC BLOOD PRESSURE: 100 MMHG | OXYGEN SATURATION: 94 % | WEIGHT: 217 LBS | BODY MASS INDEX: 27.85 KG/M2 | HEART RATE: 79 BPM | DIASTOLIC BLOOD PRESSURE: 66 MMHG

## 2024-07-17 DIAGNOSIS — I50.22 CHRONIC SYSTOLIC (CONGESTIVE) HEART FAILURE: ICD-10-CM

## 2024-07-17 DIAGNOSIS — I25.10 ATHEROSCLEROTIC HEART DISEASE OF NATIVE CORONARY ARTERY W/OUT ANGINA PECTORIS: ICD-10-CM

## 2024-07-17 PROCEDURE — G2211 COMPLEX E/M VISIT ADD ON: CPT | Mod: NC

## 2024-07-17 PROCEDURE — 93000 ELECTROCARDIOGRAM COMPLETE: CPT

## 2024-07-17 PROCEDURE — 99214 OFFICE O/P EST MOD 30 MIN: CPT | Mod: 25

## 2024-07-25 ENCOUNTER — APPOINTMENT (OUTPATIENT)
Dept: CARDIOLOGY | Facility: CLINIC | Age: 64
End: 2024-07-25

## 2024-12-02 ENCOUNTER — RX RENEWAL (OUTPATIENT)
Age: 64
End: 2024-12-02

## 2025-01-29 ENCOUNTER — APPOINTMENT (OUTPATIENT)
Dept: CARDIOLOGY | Facility: CLINIC | Age: 65
End: 2025-01-29

## 2025-02-20 ENCOUNTER — NON-APPOINTMENT (OUTPATIENT)
Age: 65
End: 2025-02-20

## 2025-02-20 ENCOUNTER — APPOINTMENT (OUTPATIENT)
Dept: OTOLARYNGOLOGY | Facility: CLINIC | Age: 65
End: 2025-02-20
Payer: COMMERCIAL

## 2025-02-20 VITALS
DIASTOLIC BLOOD PRESSURE: 71 MMHG | BODY MASS INDEX: 27.85 KG/M2 | WEIGHT: 217 LBS | HEIGHT: 74 IN | TEMPERATURE: 96.4 F | HEART RATE: 73 BPM | SYSTOLIC BLOOD PRESSURE: 108 MMHG

## 2025-02-20 DIAGNOSIS — H90.3 SENSORINEURAL HEARING LOSS, BILATERAL: ICD-10-CM

## 2025-02-20 DIAGNOSIS — J34.2 DEVIATED NASAL SEPTUM: ICD-10-CM

## 2025-02-20 DIAGNOSIS — H93.13 TINNITUS, BILATERAL: ICD-10-CM

## 2025-02-20 PROCEDURE — 92567 TYMPANOMETRY: CPT

## 2025-02-20 PROCEDURE — 92557 COMPREHENSIVE HEARING TEST: CPT

## 2025-02-20 PROCEDURE — 99203 OFFICE O/P NEW LOW 30 MIN: CPT | Mod: 25

## 2025-02-20 PROCEDURE — 31231 NASAL ENDOSCOPY DX: CPT

## 2025-02-26 ENCOUNTER — LABORATORY RESULT (OUTPATIENT)
Age: 65
End: 2025-02-26

## 2025-02-26 ENCOUNTER — NON-APPOINTMENT (OUTPATIENT)
Age: 65
End: 2025-02-26

## 2025-02-26 ENCOUNTER — APPOINTMENT (OUTPATIENT)
Dept: CARDIOLOGY | Facility: CLINIC | Age: 65
End: 2025-02-26
Payer: COMMERCIAL

## 2025-02-26 VITALS
HEART RATE: 81 BPM | WEIGHT: 215 LBS | TEMPERATURE: 97.7 F | BODY MASS INDEX: 27.59 KG/M2 | HEIGHT: 74 IN | OXYGEN SATURATION: 97 % | SYSTOLIC BLOOD PRESSURE: 109 MMHG | DIASTOLIC BLOOD PRESSURE: 77 MMHG

## 2025-02-26 DIAGNOSIS — E78.00 PURE HYPERCHOLESTEROLEMIA, UNSPECIFIED: ICD-10-CM

## 2025-02-26 DIAGNOSIS — I50.22 CHRONIC SYSTOLIC (CONGESTIVE) HEART FAILURE: ICD-10-CM

## 2025-02-26 DIAGNOSIS — I25.10 ATHEROSCLEROTIC HEART DISEASE OF NATIVE CORONARY ARTERY W/OUT ANGINA PECTORIS: ICD-10-CM

## 2025-02-26 PROCEDURE — 99214 OFFICE O/P EST MOD 30 MIN: CPT

## 2025-02-26 PROCEDURE — 93000 ELECTROCARDIOGRAM COMPLETE: CPT

## 2025-02-26 PROCEDURE — G2211 COMPLEX E/M VISIT ADD ON: CPT | Mod: NC

## 2025-02-27 LAB
ALBUMIN SERPL ELPH-MCNC: 4.7 G/DL
ALP BLD-CCNC: 72 U/L
ALT SERPL-CCNC: 22 U/L
ANION GAP SERPL CALC-SCNC: 12 MMOL/L
AST SERPL-CCNC: 22 U/L
BILIRUB SERPL-MCNC: 0.4 MG/DL
BUN SERPL-MCNC: 11 MG/DL
CALCIUM SERPL-MCNC: 9.6 MG/DL
CHLORIDE SERPL-SCNC: 104 MMOL/L
CHOLEST SERPL-MCNC: 153 MG/DL
CO2 SERPL-SCNC: 25 MMOL/L
CREAT SERPL-MCNC: 0.8 MG/DL
EGFR: 100 ML/MIN/1.73M2
GLUCOSE SERPL-MCNC: 78 MG/DL
HCT VFR BLD CALC: 42.9 %
HDLC SERPL-MCNC: 40 MG/DL
HGB BLD-MCNC: 14.6 G/DL
LDLC SERPL CALC-MCNC: 96 MG/DL
MCHC RBC-ENTMCNC: 31.7 PG
MCHC RBC-ENTMCNC: 34 G/DL
MCV RBC AUTO: 93.1 FL
NONHDLC SERPL-MCNC: 113 MG/DL
PLATELET # BLD AUTO: 223 K/UL
POTASSIUM SERPL-SCNC: 4.5 MMOL/L
PROT SERPL-MCNC: 7 G/DL
RBC # BLD: 4.61 M/UL
RBC # FLD: 12.8 %
SODIUM SERPL-SCNC: 141 MMOL/L
TRIGL SERPL-MCNC: 90 MG/DL
WBC # FLD AUTO: 11.65 K/UL

## 2025-05-13 ENCOUNTER — LABORATORY RESULT (OUTPATIENT)
Age: 65
End: 2025-05-13

## 2025-05-13 ENCOUNTER — APPOINTMENT (OUTPATIENT)
Dept: INTERNAL MEDICINE | Facility: CLINIC | Age: 65
End: 2025-05-13
Payer: COMMERCIAL

## 2025-05-13 VITALS
RESPIRATION RATE: 18 BRPM | BODY MASS INDEX: 28.85 KG/M2 | SYSTOLIC BLOOD PRESSURE: 123 MMHG | HEART RATE: 73 BPM | OXYGEN SATURATION: 99 % | DIASTOLIC BLOOD PRESSURE: 78 MMHG | HEIGHT: 72.05 IN | WEIGHT: 213 LBS

## 2025-05-13 DIAGNOSIS — Z00.00 ENCOUNTER FOR GENERAL ADULT MEDICAL EXAMINATION W/OUT ABNORMAL FINDINGS: ICD-10-CM

## 2025-05-13 LAB
BASOPHILS # BLD AUTO: 0.07 K/UL
BASOPHILS NFR BLD AUTO: 0.8 %
EOSINOPHIL # BLD AUTO: 0.19 K/UL
EOSINOPHIL NFR BLD AUTO: 2.1 %
ESTIMATED AVERAGE GLUCOSE: 114 MG/DL
HBA1C MFR BLD HPLC: 5.6 %
HCT VFR BLD CALC: 43 %
HGB BLD-MCNC: 14 G/DL
IMM GRANULOCYTES NFR BLD AUTO: 0.3 %
LYMPHOCYTES # BLD AUTO: 2.53 K/UL
LYMPHOCYTES NFR BLD AUTO: 28.5 %
MAN DIFF?: NORMAL
MCHC RBC-ENTMCNC: 31.5 PG
MCHC RBC-ENTMCNC: 32.6 G/DL
MCV RBC AUTO: 96.6 FL
MONOCYTES # BLD AUTO: 0.49 K/UL
MONOCYTES NFR BLD AUTO: 5.5 %
NEUTROPHILS # BLD AUTO: 5.56 K/UL
NEUTROPHILS NFR BLD AUTO: 62.8 %
PLATELET # BLD AUTO: 185 K/UL
RBC # BLD: 4.45 M/UL
RBC # FLD: 12.9 %
WBC # FLD AUTO: 8.87 K/UL

## 2025-05-13 PROCEDURE — 99406 BEHAV CHNG SMOKING 3-10 MIN: CPT

## 2025-05-13 PROCEDURE — 99396 PREV VISIT EST AGE 40-64: CPT

## 2025-05-15 DIAGNOSIS — R79.89 OTHER SPECIFIED ABNORMAL FINDINGS OF BLOOD CHEMISTRY: ICD-10-CM

## 2025-05-15 LAB
25(OH)D3 SERPL-MCNC: 70.8 NG/ML
ALBUMIN SERPL ELPH-MCNC: 4.5 G/DL
ALP BLD-CCNC: 67 U/L
ALT SERPL-CCNC: 34 U/L
ANION GAP SERPL CALC-SCNC: 17 MMOL/L
APPEARANCE: CLEAR
AST SERPL-CCNC: 26 U/L
BILIRUB SERPL-MCNC: 0.6 MG/DL
BILIRUBIN URINE: NEGATIVE
BLOOD URINE: NEGATIVE
BUN SERPL-MCNC: 12 MG/DL
CALCIUM SERPL-MCNC: 10.1 MG/DL
CHLORIDE SERPL-SCNC: 101 MMOL/L
CHOLEST SERPL-MCNC: 153 MG/DL
CO2 SERPL-SCNC: 21 MMOL/L
COLOR: YELLOW
CREAT SERPL-MCNC: 0.75 MG/DL
EGFRCR SERPLBLD CKD-EPI 2021: 101 ML/MIN/1.73M2
GLUCOSE QUALITATIVE U: NEGATIVE MG/DL
GLUCOSE SERPL-MCNC: 99 MG/DL
HDLC SERPL-MCNC: 41 MG/DL
KETONES URINE: NEGATIVE MG/DL
LDLC SERPL-MCNC: 97 MG/DL
LEUKOCYTE ESTERASE URINE: NEGATIVE
NITRITE URINE: NEGATIVE
NONHDLC SERPL-MCNC: 112 MG/DL
PH URINE: 6.5
POTASSIUM SERPL-SCNC: 4.4 MMOL/L
PROT SERPL-MCNC: 6.6 G/DL
PROTEIN URINE: NEGATIVE MG/DL
PSA SERPL-MCNC: 1.11 NG/ML
SODIUM SERPL-SCNC: 139 MMOL/L
SPECIFIC GRAVITY URINE: 1.02
TRIGL SERPL-MCNC: 76 MG/DL
TSH SERPL-ACNC: 5.27 UIU/ML
UROBILINOGEN URINE: 0.2 MG/DL
VIT B12 SERPL-MCNC: 1938 PG/ML

## 2025-06-16 ENCOUNTER — TRANSCRIPTION ENCOUNTER (OUTPATIENT)
Age: 65
End: 2025-06-16

## 2025-06-25 ENCOUNTER — TRANSCRIPTION ENCOUNTER (OUTPATIENT)
Age: 65
End: 2025-06-25

## 2025-07-30 ENCOUNTER — APPOINTMENT (OUTPATIENT)
Dept: CARDIOLOGY | Facility: CLINIC | Age: 65
End: 2025-07-30
Payer: COMMERCIAL

## 2025-07-30 VITALS
DIASTOLIC BLOOD PRESSURE: 67 MMHG | HEART RATE: 76 BPM | SYSTOLIC BLOOD PRESSURE: 117 MMHG | OXYGEN SATURATION: 97 % | BODY MASS INDEX: 27.9 KG/M2 | HEIGHT: 72 IN | WEIGHT: 206 LBS

## 2025-07-30 DIAGNOSIS — E78.00 PURE HYPERCHOLESTEROLEMIA, UNSPECIFIED: ICD-10-CM

## 2025-07-30 DIAGNOSIS — I25.10 ATHEROSCLEROTIC HEART DISEASE OF NATIVE CORONARY ARTERY W/OUT ANGINA PECTORIS: ICD-10-CM

## 2025-07-30 DIAGNOSIS — I50.22 CHRONIC SYSTOLIC (CONGESTIVE) HEART FAILURE: ICD-10-CM

## 2025-07-30 PROCEDURE — G2211 COMPLEX E/M VISIT ADD ON: CPT | Mod: NC

## 2025-07-30 PROCEDURE — 93000 ELECTROCARDIOGRAM COMPLETE: CPT

## 2025-07-30 PROCEDURE — 99214 OFFICE O/P EST MOD 30 MIN: CPT

## 2025-07-30 RX ORDER — ROSUVASTATIN CALCIUM 40 MG/1
40 TABLET, FILM COATED ORAL DAILY
Qty: 90 | Refills: 3 | Status: ACTIVE | COMMUNITY
Start: 2025-07-30 | End: 1900-01-01

## 2025-07-30 RX ORDER — SILDENAFIL 50 MG/1
50 TABLET ORAL AS DIRECTED
Qty: 90 | Refills: 3 | Status: ACTIVE | COMMUNITY
Start: 2025-07-30 | End: 1900-01-01

## 2025-07-31 ENCOUNTER — NON-APPOINTMENT (OUTPATIENT)
Age: 65
End: 2025-07-31